# Patient Record
Sex: FEMALE | Race: ASIAN
[De-identification: names, ages, dates, MRNs, and addresses within clinical notes are randomized per-mention and may not be internally consistent; named-entity substitution may affect disease eponyms.]

---

## 2019-02-13 ENCOUNTER — HOSPITAL ENCOUNTER (INPATIENT)
Dept: HOSPITAL 1 - ED | Age: 79
LOS: 3 days | Discharge: HOME | DRG: 606 | End: 2019-02-16
Attending: FAMILY MEDICINE | Admitting: FAMILY MEDICINE
Payer: COMMERCIAL

## 2019-02-13 VITALS
BODY MASS INDEX: 32.36 KG/M2 | WEIGHT: 150 LBS | BODY MASS INDEX: 32.36 KG/M2 | WEIGHT: 150 LBS | HEIGHT: 57 IN | HEIGHT: 57 IN

## 2019-02-13 VITALS — DIASTOLIC BLOOD PRESSURE: 64 MMHG | SYSTOLIC BLOOD PRESSURE: 95 MMHG

## 2019-02-13 DIAGNOSIS — S80.821A: Primary | ICD-10-CM

## 2019-02-13 DIAGNOSIS — N17.0: ICD-10-CM

## 2019-02-13 DIAGNOSIS — E44.1: ICD-10-CM
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
## 2019-02-13 DIAGNOSIS — E02: ICD-10-CM
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
## 2019-02-13 DIAGNOSIS — Z91.81: ICD-10-CM

## 2019-02-13 DIAGNOSIS — I50.33: ICD-10-CM

## 2019-02-13 DIAGNOSIS — I48.2: ICD-10-CM

## 2019-02-13 DIAGNOSIS — D68.69: ICD-10-CM

## 2019-02-13 DIAGNOSIS — E11.65: ICD-10-CM

## 2019-02-13 DIAGNOSIS — I11.0: ICD-10-CM
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
## 2019-02-13 LAB
ALBUMIN SERPL-MCNC: 3.2 G/DL (ref 3.4–5)
ALP SERPL-CCNC: 143 U/L (ref 46–116)
ALT SERPL-CCNC: 12 U/L (ref 14–59)
AMYLASE SERPL-CCNC: 83 U/L (ref 25–115)
AST SERPL-CCNC: 15 U/L (ref 15–37)
BASOPHILS NFR BLD: 0.7 % (ref 0–2)
BILIRUB SERPL-MCNC: 1 MG/DL (ref 0.2–1)
BUN SERPL-MCNC: 35 MG/DL (ref 7–18)
CALCIUM SERPL-MCNC: 8.1 MG/DL (ref 8.5–10.1)
CHLORIDE SERPL-SCNC: 106 MMOL/L (ref 98–107)
CHOLEST SERPL-MCNC: 52 MG/DL (ref ?–200)
CHOLEST/HDLC SERPL: 1.3 MG/DL
CO2 SERPL-SCNC: 22.6 MMOL/L (ref 21–32)
CREAT SERPL-MCNC: 2.3 MG/DL (ref 0.6–1)
ERYTHROCYTE [DISTWIDTH] IN BLOOD BY AUTOMATED COUNT: 19.5 % (ref 11.5–14.5)
GFR SERPLBLD BASED ON 1.73 SQ M-ARVRAT: (no result) ML/MIN
GLUCOSE SERPL-MCNC: 119 MG/DL (ref 74–106)
HDLC SERPL-MCNC: 40 MG/DL (ref 40–60)
LIPASE SERPL-CCNC: 173 IU/L (ref 73–393)
MAGNESIUM SERPL-MCNC: 1.4 MG/DL (ref 1.8–2.4)
PHOSPHATE SERPL-MCNC: 4.4 MG/DL (ref 2.5–4.9)
PLATELET # BLD: 95 X10^3MCL (ref 130–400)
POTASSIUM SERPL-SCNC: 4.8 MMOL/L (ref 3.5–5.1)
PROT SERPL-MCNC: 6.6 G/DL (ref 6.4–8.2)
SODIUM SERPL-SCNC: 144 MMOL/L (ref 136–145)
T3 SERPL-MCNC: 0.87 NG/ML
T3RU NFR SERPL: 38 % UPTAKE (ref 30–39)
T4 FREE SERPL-MCNC: 1.58 NG/DL (ref 0.76–1.46)
T4 SERPL-MCNC: 10.1 UG/DL (ref 4.7–13.3)
T4/T3 UPTAKE INDEX SERPL: 3.8 UG/DL (ref 1.4–4.5)
TRIGL SERPL-MCNC: 62 MG/DL (ref ?–150)

## 2019-02-14 VITALS — DIASTOLIC BLOOD PRESSURE: 82 MMHG | SYSTOLIC BLOOD PRESSURE: 125 MMHG

## 2019-02-14 VITALS — SYSTOLIC BLOOD PRESSURE: 96 MMHG | DIASTOLIC BLOOD PRESSURE: 72 MMHG

## 2019-02-14 VITALS — SYSTOLIC BLOOD PRESSURE: 127 MMHG | DIASTOLIC BLOOD PRESSURE: 74 MMHG

## 2019-02-14 VITALS — DIASTOLIC BLOOD PRESSURE: 53 MMHG | SYSTOLIC BLOOD PRESSURE: 111 MMHG

## 2019-02-14 VITALS — DIASTOLIC BLOOD PRESSURE: 83 MMHG | SYSTOLIC BLOOD PRESSURE: 125 MMHG

## 2019-02-14 LAB
BASOPHILS NFR BLD: 0.4 % (ref 0–2)
BUN SERPL-MCNC: 35 MG/DL (ref 7–18)
CALCIUM SERPL-MCNC: 8 MG/DL (ref 8.5–10.1)
CHLORIDE SERPL-SCNC: 106 MMOL/L (ref 98–107)
CO2 SERPL-SCNC: 25 MMOL/L (ref 21–32)
CREAT SERPL-MCNC: 2.3 MG/DL (ref 0.6–1)
ERYTHROCYTE [DISTWIDTH] IN BLOOD BY AUTOMATED COUNT: 19.4 % (ref 11.5–14.5)
GFR SERPLBLD BASED ON 1.73 SQ M-ARVRAT: (no result) ML/MIN
GLUCOSE SERPL-MCNC: 145 MG/DL (ref 74–106)
MAGNESIUM SERPL-MCNC: 1.5 MG/DL (ref 1.8–2.4)
MICROSCOPIC UR-IMP: YES
PHOSPHATE SERPL-MCNC: 4.2 MG/DL (ref 2.5–4.9)
PLATELET # BLD: 94 X10^3MCL (ref 130–400)
POTASSIUM SERPL-SCNC: 4.6 MMOL/L (ref 3.5–5.1)
RBC # UR STRIP.AUTO: NEGATIVE /UL
SODIUM SERPL-SCNC: 142 MMOL/L (ref 136–145)
UA SPECIFIC GRAVITY: 1.02 (ref 1–1.03)

## 2019-02-14 PROCEDURE — 0H9KXZZ DRAINAGE OF RIGHT LOWER LEG SKIN, EXTERNAL APPROACH: ICD-10-PCS | Performed by: STUDENT IN AN ORGANIZED HEALTH CARE EDUCATION/TRAINING PROGRAM

## 2019-02-15 VITALS — DIASTOLIC BLOOD PRESSURE: 90 MMHG | SYSTOLIC BLOOD PRESSURE: 126 MMHG

## 2019-02-15 VITALS — DIASTOLIC BLOOD PRESSURE: 76 MMHG | SYSTOLIC BLOOD PRESSURE: 142 MMHG

## 2019-02-15 VITALS — DIASTOLIC BLOOD PRESSURE: 74 MMHG | SYSTOLIC BLOOD PRESSURE: 126 MMHG

## 2019-02-15 VITALS — SYSTOLIC BLOOD PRESSURE: 121 MMHG | DIASTOLIC BLOOD PRESSURE: 78 MMHG

## 2019-02-15 VITALS — SYSTOLIC BLOOD PRESSURE: 125 MMHG | DIASTOLIC BLOOD PRESSURE: 74 MMHG

## 2019-02-15 LAB
BASOPHILS NFR BLD: 0.4 % (ref 0–2)
BUN SERPL-MCNC: 34 MG/DL (ref 7–18)
CALCIUM SERPL-MCNC: 8.5 MG/DL (ref 8.5–10.1)
CHLORIDE SERPL-SCNC: 106 MMOL/L (ref 98–107)
CO2 SERPL-SCNC: 26.9 MMOL/L (ref 21–32)
CREAT SERPL-MCNC: 1.8 MG/DL (ref 0.6–1)
ERYTHROCYTE [DISTWIDTH] IN BLOOD BY AUTOMATED COUNT: 19 % (ref 11.5–14.5)
GFR SERPLBLD BASED ON 1.73 SQ M-ARVRAT: (no result) ML/MIN
GLUCOSE SERPL-MCNC: 108 MG/DL (ref 74–106)
MAGNESIUM SERPL-MCNC: 1.6 MG/DL (ref 1.8–2.4)
PLATELET # BLD: 94 X10^3MCL (ref 130–400)
POTASSIUM SERPL-SCNC: 4.1 MMOL/L (ref 3.5–5.1)
SODIUM SERPL-SCNC: 143 MMOL/L (ref 136–145)

## 2019-02-16 VITALS — DIASTOLIC BLOOD PRESSURE: 63 MMHG | SYSTOLIC BLOOD PRESSURE: 95 MMHG

## 2019-02-16 VITALS — SYSTOLIC BLOOD PRESSURE: 150 MMHG | DIASTOLIC BLOOD PRESSURE: 94 MMHG

## 2019-02-16 VITALS — SYSTOLIC BLOOD PRESSURE: 132 MMHG | DIASTOLIC BLOOD PRESSURE: 78 MMHG

## 2019-02-16 VITALS — SYSTOLIC BLOOD PRESSURE: 95 MMHG | DIASTOLIC BLOOD PRESSURE: 63 MMHG

## 2019-02-16 LAB
BASOPHILS NFR BLD: 0.4 % (ref 0–2)
BUN SERPL-MCNC: 28 MG/DL (ref 7–18)
CALCIUM SERPL-MCNC: 9 MG/DL (ref 8.5–10.1)
CHLORIDE SERPL-SCNC: 107 MMOL/L (ref 98–107)
CO2 SERPL-SCNC: 29.8 MMOL/L (ref 21–32)
CREAT SERPL-MCNC: 1.3 MG/DL (ref 0.6–1)
ERYTHROCYTE [DISTWIDTH] IN BLOOD BY AUTOMATED COUNT: 18.8 % (ref 11.5–14.5)
GFR SERPLBLD BASED ON 1.73 SQ M-ARVRAT: (no result) ML/MIN
GLUCOSE SERPL-MCNC: 109 MG/DL (ref 74–106)
PLATELET # BLD: 107 X10^3MCL (ref 130–400)
POTASSIUM SERPL-SCNC: 3.4 MMOL/L (ref 3.5–5.1)
SODIUM SERPL-SCNC: 147 MMOL/L (ref 136–145)

## 2019-08-12 ENCOUNTER — HOSPITAL ENCOUNTER (INPATIENT)
Dept: HOSPITAL 1 - ED | Age: 79
LOS: 16 days | Discharge: NURSING, REHAB OR LONG-TERM CARE | DRG: 870 | End: 2019-08-28
Attending: HOSPITALIST | Admitting: HOSPITALIST
Payer: COMMERCIAL

## 2019-08-12 VITALS — DIASTOLIC BLOOD PRESSURE: 62 MMHG | SYSTOLIC BLOOD PRESSURE: 90 MMHG

## 2019-08-12 VITALS
HEIGHT: 62 IN | HEIGHT: 62 IN | WEIGHT: 163.14 LBS | WEIGHT: 163.14 LBS | BODY MASS INDEX: 30.02 KG/M2 | BODY MASS INDEX: 30.02 KG/M2

## 2019-08-12 VITALS — SYSTOLIC BLOOD PRESSURE: 93 MMHG | DIASTOLIC BLOOD PRESSURE: 54 MMHG

## 2019-08-12 VITALS — DIASTOLIC BLOOD PRESSURE: 63 MMHG | SYSTOLIC BLOOD PRESSURE: 92 MMHG

## 2019-08-12 VITALS — DIASTOLIC BLOOD PRESSURE: 66 MMHG | SYSTOLIC BLOOD PRESSURE: 101 MMHG

## 2019-08-12 VITALS — DIASTOLIC BLOOD PRESSURE: 56 MMHG | SYSTOLIC BLOOD PRESSURE: 94 MMHG

## 2019-08-12 VITALS — DIASTOLIC BLOOD PRESSURE: 59 MMHG | SYSTOLIC BLOOD PRESSURE: 92 MMHG

## 2019-08-12 VITALS — DIASTOLIC BLOOD PRESSURE: 63 MMHG | SYSTOLIC BLOOD PRESSURE: 90 MMHG

## 2019-08-12 VITALS — DIASTOLIC BLOOD PRESSURE: 75 MMHG | SYSTOLIC BLOOD PRESSURE: 110 MMHG

## 2019-08-12 VITALS — SYSTOLIC BLOOD PRESSURE: 89 MMHG | DIASTOLIC BLOOD PRESSURE: 71 MMHG

## 2019-08-12 VITALS — DIASTOLIC BLOOD PRESSURE: 75 MMHG | SYSTOLIC BLOOD PRESSURE: 90 MMHG

## 2019-08-12 VITALS — SYSTOLIC BLOOD PRESSURE: 109 MMHG | DIASTOLIC BLOOD PRESSURE: 77 MMHG

## 2019-08-12 VITALS — SYSTOLIC BLOOD PRESSURE: 110 MMHG | DIASTOLIC BLOOD PRESSURE: 68 MMHG

## 2019-08-12 VITALS — SYSTOLIC BLOOD PRESSURE: 68 MMHG | DIASTOLIC BLOOD PRESSURE: 44 MMHG

## 2019-08-12 VITALS — SYSTOLIC BLOOD PRESSURE: 96 MMHG | DIASTOLIC BLOOD PRESSURE: 59 MMHG

## 2019-08-12 VITALS — SYSTOLIC BLOOD PRESSURE: 89 MMHG | DIASTOLIC BLOOD PRESSURE: 55 MMHG

## 2019-08-12 DIAGNOSIS — I27.20: ICD-10-CM

## 2019-08-12 DIAGNOSIS — Z79.84: ICD-10-CM

## 2019-08-12 DIAGNOSIS — G93.41: ICD-10-CM

## 2019-08-12 DIAGNOSIS — J90: ICD-10-CM

## 2019-08-12 DIAGNOSIS — I48.2: ICD-10-CM

## 2019-08-12 DIAGNOSIS — R65.20: ICD-10-CM

## 2019-08-12 DIAGNOSIS — J96.01: ICD-10-CM

## 2019-08-12 DIAGNOSIS — E83.39: ICD-10-CM

## 2019-08-12 DIAGNOSIS — A41.9: Primary | ICD-10-CM

## 2019-08-12 DIAGNOSIS — N18.9: ICD-10-CM

## 2019-08-12 DIAGNOSIS — E87.5: ICD-10-CM

## 2019-08-12 DIAGNOSIS — Z93.1: ICD-10-CM

## 2019-08-12 DIAGNOSIS — E43: ICD-10-CM

## 2019-08-12 DIAGNOSIS — E87.0: ICD-10-CM

## 2019-08-12 DIAGNOSIS — M81.0: ICD-10-CM

## 2019-08-12 DIAGNOSIS — N17.0: ICD-10-CM

## 2019-08-12 DIAGNOSIS — J69.0: ICD-10-CM

## 2019-08-12 DIAGNOSIS — I50.32: ICD-10-CM

## 2019-08-12 DIAGNOSIS — L89.152: ICD-10-CM

## 2019-08-12 DIAGNOSIS — E11.9: ICD-10-CM

## 2019-08-12 DIAGNOSIS — N39.0: ICD-10-CM

## 2019-08-12 LAB
ALBUMIN SERPL-MCNC: 1.7 G/DL (ref 3.4–5)
ALBUMIN SERPL-MCNC: 1.9 G/DL (ref 3.4–5)
ALP SERPL-CCNC: 113 U/L (ref 46–116)
ALP SERPL-CCNC: 118 U/L (ref 46–116)
ALT SERPL-CCNC: 12 U/L (ref 14–59)
ALT SERPL-CCNC: 15 U/L (ref 14–59)
AMYLASE SERPL-CCNC: 42 U/L (ref 25–115)
AST SERPL-CCNC: 13 U/L (ref 15–37)
AST SERPL-CCNC: 19 U/L (ref 15–37)
BASOPHILS NFR BLD: 0.1 % (ref 0–2)
BASOPHILS NFR BLD: 0.3 % (ref 0–2)
BILIRUB SERPL-MCNC: 0.5 MG/DL (ref 0.2–1)
BILIRUB SERPL-MCNC: 0.6 MG/DL (ref 0.2–1)
BUN SERPL-MCNC: 41 MG/DL (ref 7–18)
BUN SERPL-MCNC: 46 MG/DL (ref 7–18)
BUN SERPL-MCNC: 48 MG/DL (ref 7–18)
CALCIUM SERPL-MCNC: 7.5 MG/DL (ref 8.5–10.1)
CALCIUM SERPL-MCNC: 8.1 MG/DL (ref 8.5–10.1)
CALCIUM SERPL-MCNC: 8.3 MG/DL (ref 8.5–10.1)
CHLORIDE SERPL-SCNC: 103 MMOL/L (ref 98–107)
CHLORIDE SERPL-SCNC: 105 MMOL/L (ref 98–107)
CHLORIDE SERPL-SCNC: 105 MMOL/L (ref 98–107)
CHOLEST SERPL-MCNC: 97 MG/DL (ref ?–200)
CHOLEST/HDLC SERPL: 3.6 MG/DL
CO2 SERPL-SCNC: 23.7 MMOL/L (ref 21–32)
CO2 SERPL-SCNC: 29.2 MMOL/L (ref 21–32)
CO2 SERPL-SCNC: 29.3 MMOL/L (ref 21–32)
CREAT SERPL-MCNC: 1.1 MG/DL (ref 0.6–1)
CREAT SERPL-MCNC: 1.2 MG/DL (ref 0.6–1)
CREAT SERPL-MCNC: 1.4 MG/DL (ref 0.6–1)
ERYTHROCYTE [DISTWIDTH] IN BLOOD BY AUTOMATED COUNT: 18 % (ref 11.5–14.5)
ERYTHROCYTE [DISTWIDTH] IN BLOOD BY AUTOMATED COUNT: 18 % (ref 11.5–14.5)
GFR SERPLBLD BASED ON 1.73 SQ M-ARVRAT: (no result) ML/MIN
GLUCOSE SERPL-MCNC: 147 MG/DL (ref 74–106)
GLUCOSE SERPL-MCNC: 160 MG/DL (ref 74–106)
GLUCOSE SERPL-MCNC: 92 MG/DL (ref 74–106)
HDLC SERPL-MCNC: 27 MG/DL (ref 40–60)
IRON SERPL-MCNC: 47 UG/DL (ref 50–170)
LIPASE SERPL-CCNC: 80 IU/L (ref 73–393)
MAGNESIUM SERPL-MCNC: 2.3 MG/DL (ref 1.8–2.4)
MICROSCOPIC UR-IMP: YES
PHOSPHATE SERPL-MCNC: 6.8 MG/DL (ref 2.5–4.9)
PLATELET # BLD: 150 X10^3MCL (ref 130–400)
PLATELET # BLD: 177 X10^3MCL (ref 130–400)
POTASSIUM SERPL-SCNC: 4.6 MMOL/L (ref 3.5–5.1)
POTASSIUM SERPL-SCNC: 6.4 MMOL/L (ref 3.5–5.1)
POTASSIUM SERPL-SCNC: 6.8 MMOL/L (ref 3.5–5.1)
PROT SERPL-MCNC: 4.9 G/DL (ref 6.4–8.2)
PROT SERPL-MCNC: 5.3 G/DL (ref 6.4–8.2)
RBC # BLD AUTO: 2.84 M/MM3 (ref 4.1–5.1)
RBC # UR STRIP.AUTO: (no result) /UL
SODIUM SERPL-SCNC: 137 MMOL/L (ref 136–145)
SODIUM SERPL-SCNC: 138 MMOL/L (ref 136–145)
SODIUM SERPL-SCNC: 141 MMOL/L (ref 136–145)
T3 SERPL-MCNC: 0.32 NG/ML
T3RU NFR SERPL: 42 % UPTAKE (ref 30–39)
T4 FREE SERPL-MCNC: 0.99 NG/DL (ref 0.76–1.46)
T4 SERPL-MCNC: 3.6 UG/DL (ref 4.7–13.3)
T4/T3 UPTAKE INDEX SERPL: 1.5 UG/DL (ref 1.4–4.5)
TIBC SERPL-MCNC: 174 UG/DL (ref 250–450)
TRIGL SERPL-MCNC: 119 MG/DL (ref ?–150)
UA SPECIFIC GRAVITY: >=1.03 (ref 1–1.03)

## 2019-08-12 PROCEDURE — G0378 HOSPITAL OBSERVATION PER HR: HCPCS

## 2019-08-12 PROCEDURE — 0BH17EZ INSERTION OF ENDOTRACHEAL AIRWAY INTO TRACHEA, VIA NATURAL OR ARTIFICIAL OPENING: ICD-10-PCS | Performed by: EMERGENCY MEDICINE

## 2019-08-12 PROCEDURE — A4628 OROPHARYNGEAL SUCTION CATH: HCPCS

## 2019-08-12 PROCEDURE — 05HM33Z INSERTION OF INFUSION DEVICE INTO RIGHT INTERNAL JUGULAR VEIN, PERCUTANEOUS APPROACH: ICD-10-PCS | Performed by: EMERGENCY MEDICINE

## 2019-08-12 PROCEDURE — 5A1955Z RESPIRATORY VENTILATION, GREATER THAN 96 CONSECUTIVE HOURS: ICD-10-PCS | Performed by: INTERNAL MEDICINE

## 2019-08-12 PROCEDURE — C1729 CATH, DRAINAGE: HCPCS

## 2019-08-12 NOTE — NUR
ADJUSTED FENTANYL TO 2.7MCG/KG/H PER DR. COLLINS WANTS 200MCG PER HOUR. PT WAS
OPENING EYES AND TRYING TO GRAB TUBES. VSS. NO DISTRESS NOED. PT EYES CLOSED
AND WITH VISUAL CHEST RISE AND FALL.

## 2019-08-12 NOTE — NUR
PT BIBA PER PARAMEDIC PT WAS LAST SEEN IN NORMAL LOC AT 0400. PER PARAMEDIC
NURSE STS THAT PT WOULD COMMUNICATE BY NODDING. PT BECAME MORE UNRESPONSIVE. PT
WAS BAGGED IN THE FEILD AND UPON ARRIVAL. DR. ZUNIGA AT BEDSIDE FOR INTUBATION
AND CENTRAL LINE PLACEMENT. PT ARRIVED W/ URINARY CATH, PER PARAMEDIC PT WAS
ADMITTED TO FACILITY FOR UTI. DR. ZUNIGA, RESPIRATORY, EMT, NURSE CARROLL BECKMAN
AND JIM AT BEDSIDE.

## 2019-08-12 NOTE — NUR
URINE OUTPUT @700 CLEAR AND YELLOW. FAMILY AT BEDSIDE. VSS. RESP E/U. PT
TOLERATING WELL. DR. العلي AT BEDSIDE WITH FAMILY.

## 2019-08-12 NOTE — NUR
RECIEVED REPORT FROM ABRAN CEBALLOS. POC DISCUSSED. NURSING UPDATES. RESUMED CARE OF
PT. SEE SHIFT ASSESSMENT FOR ASSESSMENT.

## 2019-08-12 NOTE — NUR
PT ARRIVED FROM ED ON GURNEY ACCOMPANIED BY RNS, RTS, EMTS. PT IS INTUBATED
AND SEDATED ON FENTANYL @ 2.5 MCG/KG/HR TO RSS = 5. SLUGGISH RESPONSE TO
PAINFUL STIMULI. DOES NOT FOLLOW COMMANDS. PUPILS SLUGGISH BILATERALLY. SIZE
7.5 ETT INTACT AND SECURED, 20 @ LL. OGT INTACT AND SECURED, CLAMPED. R IJ CVC
PATENT/CDI. A FIB. S1 S2. ABD IS SOFT AND FLAT. G TUBE INTACT AND CDI. R HAND,
L HAND IVS PATENT/CDI. RLE IO INTACT. F/C TO GRAVITY, URINE IS YELLOW, FAIR
OUTPUT. GENERALIZED WEAKNESS. ON SOFT B/L WRIST RESTRAINTS. NAD. HOB ELEVATED,
BED LOW, SIDE RAILS UP X2, CALL LIGHT IN REACH.

## 2019-08-12 NOTE — NUR
PT BECOMING HYPOTENSIVE WITH LOWEST MAP 47 IN PAST [address] SUSANNE MCLEAN,
ORDERS RECIEVED TO BOLUS 500 ML NS AND WILL START PRESSOR TX.

## 2019-08-12 NOTE — NUR
PT MEDICATED PER EMAR WITH VELTASSA VIA GTUBE, +BOWEL SOUNDS UPON
AUSCULTATION, FOLLOWED MED ADMIN WITH 20CC OF STERILE WATER, PT TOLERATED WELL.

## 2019-08-12 NOTE — NUR
PRESSURE ULCER NOTED TO SACRUM. PHOTOGRAPHIC DOCUMENTATION TAKEN.
1.5CM X 1CM ULCER W/ PINK WOUND BED. NO D/C NOTED. OPTIFOAM APPLIED. FLOATED.

## 2019-08-12 NOTE — NUR
PT TAKEN TO CT.
REPORT RECEIVED FROM LAURIE OSULLIVAN TO ASSUME CARE OF PT.
OGNEEDS TO BE PULLED BACK AND XRAY NEED TO BE TAKEN. PER DR. العلي
DO NOT USE OG NEEDS XRAY FOR PLACEMENT.

## 2019-08-12 NOTE — NUR
FENTANYL INCREASED 1.3MCG/KG/HR. PT WAS OPENING EYES AND AGGITATEDTRYING TO
GRAB TUBE. DR. العلي MADE AWARE.

## 2019-08-12 NOTE — NUR
REPORT GIVEN TO TUCKER OSULLIVAN, TO ASSUME CARE OF PT. INFORMED TO NOT USE OG BECAUSE
OF WROGE PLACEMENT. RN INFOMRED THAT PT STILL HAS I&O TO RIGHT TIB. VSS UPON
TRANSPORTATION.

## 2019-08-12 NOTE — NUR
LEVOPHED INITIATED AT THIS TIME D/T MAP PERSITENTLY UNDER 60 FOR PAST 30 MINS.
LEVOPHED INITIATED @ 2MCG/MIN THROUGH R IJ CVC.

## 2019-08-13 VITALS — DIASTOLIC BLOOD PRESSURE: 54 MMHG | SYSTOLIC BLOOD PRESSURE: 91 MMHG

## 2019-08-13 VITALS — SYSTOLIC BLOOD PRESSURE: 94 MMHG | DIASTOLIC BLOOD PRESSURE: 62 MMHG

## 2019-08-13 VITALS — DIASTOLIC BLOOD PRESSURE: 71 MMHG | SYSTOLIC BLOOD PRESSURE: 96 MMHG

## 2019-08-13 VITALS — DIASTOLIC BLOOD PRESSURE: 62 MMHG | SYSTOLIC BLOOD PRESSURE: 113 MMHG

## 2019-08-13 VITALS — SYSTOLIC BLOOD PRESSURE: 95 MMHG | DIASTOLIC BLOOD PRESSURE: 60 MMHG

## 2019-08-13 VITALS — SYSTOLIC BLOOD PRESSURE: 100 MMHG | DIASTOLIC BLOOD PRESSURE: 66 MMHG

## 2019-08-13 VITALS — SYSTOLIC BLOOD PRESSURE: 81 MMHG | DIASTOLIC BLOOD PRESSURE: 49 MMHG

## 2019-08-13 VITALS — DIASTOLIC BLOOD PRESSURE: 66 MMHG | SYSTOLIC BLOOD PRESSURE: 87 MMHG

## 2019-08-13 VITALS — SYSTOLIC BLOOD PRESSURE: 111 MMHG | DIASTOLIC BLOOD PRESSURE: 77 MMHG

## 2019-08-13 VITALS — SYSTOLIC BLOOD PRESSURE: 115 MMHG | DIASTOLIC BLOOD PRESSURE: 85 MMHG

## 2019-08-13 VITALS — DIASTOLIC BLOOD PRESSURE: 69 MMHG | SYSTOLIC BLOOD PRESSURE: 98 MMHG

## 2019-08-13 VITALS — SYSTOLIC BLOOD PRESSURE: 97 MMHG | DIASTOLIC BLOOD PRESSURE: 64 MMHG

## 2019-08-13 VITALS — SYSTOLIC BLOOD PRESSURE: 117 MMHG | DIASTOLIC BLOOD PRESSURE: 58 MMHG

## 2019-08-13 VITALS — DIASTOLIC BLOOD PRESSURE: 68 MMHG | SYSTOLIC BLOOD PRESSURE: 108 MMHG

## 2019-08-13 VITALS — DIASTOLIC BLOOD PRESSURE: 74 MMHG | SYSTOLIC BLOOD PRESSURE: 113 MMHG

## 2019-08-13 LAB
ALBUMIN SERPL-MCNC: 2 G/DL (ref 3.4–5)
BASOPHILS NFR BLD: 0 % (ref 0–2)
BUN SERPL-MCNC: 36 MG/DL (ref 7–18)
CALCIUM SERPL-MCNC: 8.4 MG/DL (ref 8.5–10.1)
CHLORIDE SERPL-SCNC: 105 MMOL/L (ref 98–107)
CO2 SERPL-SCNC: 27.7 MMOL/L (ref 21–32)
CREAT SERPL-MCNC: 1 MG/DL (ref 0.6–1)
ERYTHROCYTE [DISTWIDTH] IN BLOOD BY AUTOMATED COUNT: 17.6 % (ref 11.5–14.5)
GFR SERPLBLD BASED ON 1.73 SQ M-ARVRAT: (no result) ML/MIN
GLUCOSE SERPL-MCNC: 120 MG/DL (ref 74–106)
MAGNESIUM SERPL-MCNC: 2.1 MG/DL (ref 1.8–2.4)
PHOSPHATE SERPL-MCNC: 4 MG/DL (ref 2.5–4.9)
PLATELET # BLD: 163 X10^3MCL (ref 130–400)
POTASSIUM SERPL-SCNC: 4.2 MMOL/L (ref 3.5–5.1)
SODIUM SERPL-SCNC: 142 MMOL/L (ref 136–145)

## 2019-08-13 NOTE — NUR
PATIENT REMAINS INTUBATED WITH NO SEDATION AT THIS TIME. PATIENT OPENS HER
EYES SPONTANEOUSLY AND FOLLOWS SOME SIMPLE COMMANDS. ETT 7.5 SECURED TO FACE
AND AT 20CM AT LIPLINE. ETT TO VENT VIA VCV/AC MODE: FIO2 30%, RATE 14, 
AND PEEP 5. OGT IN PLACE AND SECURED TO ETT. OGT PLACEMENT VERIFIED WITH SOME
AIR BOLUS. OGT CLAMPED. CVC TO RIJ WITH DRESSING IN PLACE. OTHER IV SITES TO
LEFT HAND AND RIGHT HAND. PEG TUBE IN PLACE AND CONNECTED TO TUBE FEEDING WITH
VITAL AF AT 10ML/HR. NO RESIDUAL AT THIS TIME. TF INCREASED TO 20ML/HR. AND
FREE WATER FLUSH AT 50ML/Q4HR. DIAZ CATH TO GRAVITY DRAINING YELLOW URINE.
CALL LIGHT WITHIN REACH. SIDE RAILS UP X3. BED IS AT LOWEST POSITION. ALARM IS
ON. HEAD OF BED ELEVATED. BLE ELEVATED ON PILLOWS.

## 2019-08-13 NOTE — NUR
PT W/ AGITATION. TITRATED VERSED FROM OFF TO 1MG PT TOLERATED WELL. NO S/S OF
DISTRESS. LINENS AND GOWN CHANGED FOR PT BM BROWN UNFORMED LIQUID. PT
TOLERATED WELL. WILL CONT TO MONITOR.

## 2019-08-13 NOTE — NUR
PATIENT HAD BM WITH MODERATE AMOUNT OF YELLOW LIQUID STOOL. STOOL SAMPLE WAS
COLLECTED AND SENT TO THE LAB FOR OB.

## 2019-08-13 NOTE — NUR
NURSING UPDATES PER MARGARITA DAUGHTER OF PT. PHONE PROTOCOL FOLLOWED. MARGARITA
STATED SHE WOULD BE BY LATER. WILL ENDORSE.

## 2019-08-13 NOTE — NUR
RECEIVED REPORT FROM ABRAN FALL. PT IS ALERT AND ABLE TO FOLLOW SIMPLE COMMANDS.
PT IS INTUBATED ON NO SEDATION. 7.5 ETT AT 20 CM LL. LUNG SOUNDS COURSE
CRACKLES TO BILATERAL UPPER LOBES, DIMINISHED TO BILATERAL LOWER LOBES. S1 S2
HEART SOUNDS AUSCULTATED IN A FIB. PERIPHERAL IV TO RIJ AND LEFT HAND
PATENT AND DRESSING CDI. PT HAS MODERATE PULSES TO BUE/BLE. SKIN IS WARM
AND CONSISTENT WITH ETHNICITY. PT HAS SCATTERED ECCHYMOSIS TO BUE/BLE. AND
WOUND TO SACRUM WITH OPTIFOAM IN PLACE. ABD  IS SOFT AND FLAT WITH ACTIVE
BOWEL SOUNDS X4Q. PEG TUBE TO UPPER QUADRANTS. VITAL AF INFUSING AT 35 ML/HR.
DIAZ DRAINING VIA GRAVITY. URINE IS YELLOW WITH FAIR OUTPUT. ALL QUESTIONS
AND CONCERNS ANSWERED.

## 2019-08-13 NOTE — NUR
RT JORDY IS AT BEDSIDE AND SWITCHES THE VENT SETTING TO VCV/AC MODE: FIO2 30%,
RATE 14, , PEEP 5.
RESIDUAL 0 AT THIS TIME. TUBE FEEDING RATE INCREASED FROM 20ML/HR TO 30ML/HR
AS ORDERED.

## 2019-08-13 NOTE — NUR
RESIDUAL CHECKED 0. TUBE FEEDING RATE INCREASED FROM 30ML/HR TO 35ML/HR TO
REACH THE GOAL AS ORDERED.

## 2019-08-13 NOTE — NUR
IO AT RIGHT LEG REMOVED WITH THE TIP INTACT. NO BLEEDING NOTED AT THIS TIME.
PRESSURE DRESSING APPLIED.
OGT REMOVED WITH ASSISTANCE OF RT SPENCE. THE OGT TIP INTACT.

## 2019-08-13 NOTE — NUR
WOUND CARE EVALUATION NOTE:
REASON FOR EVALUATION: LOW NEVILLE SCALE AND SACRALCOCCYX WOUND
SKIN ASSESSMENT DONE WITH THIS 80 Y/O FEMALE PT ADMITTED FROM [facility] TO [facility]
WITH INITIAL DX AMS.  PAST MEDICAL HX INCLUDES HTN, DM, H/O GI BLEED,
A-FIB AND PT. ADMITTED WITH PRESSURE ULCER STAGE 2 TO SACRALCOYX. PT IS
AWAKE AND INTUBATED. SKIN IS WARM AND MOIST, BUE MULTIPLE ECCHYMOSIS FROM
BLOOD DRAW. BLE WITH DRY FLAKY SKIN, NO HAIR GROWTH, NO EDEMA.  DORSAL PEDAL
PULSES PRESENT AND NORMAL. PLAN OF CARE DISCUSSE WITH PRIMARY RN AND PT. PT.
BLINK HER EYES.
 
INTEGUMENTARY:
-MOIST ASSOCIATED DERMATITIS TO BUTTOCKS, PERINEUM, GROINS AREA, SKIN REDNESS
INTACT.
-SACRALCOCCYX PRESSURE ULCER STAGE 2, WITH 1.5X1X0.1 CM WOUND BED  %
GRANULATING AND CINDY-WOUND  SKIN INTACT, OPTIFOAM IN PLACE
-BLE DRYNESS WITH SKIN INTACT
-BILATERAL HEELS BLANCHABLE REDNESS, SKIN INTACT
 
RECOMMENDATIONS:
-CLEANSE WITH SOAP AND WATER, PAT DRY, APPLY HYDRAGUARD TO BUTTOCKS, PERINEUM,
GROINS AREAS BID AND OPEN TO AIR
-CLEANSE SACRALCOCCYX PRESSURE INJURY WITH NS, PAT DRY, APPLY Z GUARD AND
COVER WITH OPTIFOAM QD AND PRN IF SOILING.
-OFFLOAD BILATERAL HEELS BY PLACING PILLOWS UNDER CALVES UNLESS OTHERWISE
CONTRAINDICATED
-PRESSURE REDISTRIBUTION SURFACE THERAPY
-TURN AND REPOSITION Q2H, OFFLOAD RIGHT, LEFT EARS AND SACRALCOCCYX
-KEEP SKIN DRY AND CLEAN AT ALL TIMES
-CONTINUE TO FOLLOW RD RECOMMENDATIONS
 
PLEASE CONTACT WOUND CARE NURSE FOR ANY QUESTION AND CHANGE OF WOUND
CONDITION.

## 2019-08-14 VITALS — SYSTOLIC BLOOD PRESSURE: 111 MMHG | DIASTOLIC BLOOD PRESSURE: 69 MMHG

## 2019-08-14 VITALS — DIASTOLIC BLOOD PRESSURE: 60 MMHG | SYSTOLIC BLOOD PRESSURE: 111 MMHG

## 2019-08-14 VITALS — SYSTOLIC BLOOD PRESSURE: 108 MMHG | DIASTOLIC BLOOD PRESSURE: 75 MMHG

## 2019-08-14 VITALS — DIASTOLIC BLOOD PRESSURE: 71 MMHG | SYSTOLIC BLOOD PRESSURE: 136 MMHG

## 2019-08-14 VITALS — DIASTOLIC BLOOD PRESSURE: 78 MMHG | SYSTOLIC BLOOD PRESSURE: 127 MMHG

## 2019-08-14 VITALS — DIASTOLIC BLOOD PRESSURE: 64 MMHG | SYSTOLIC BLOOD PRESSURE: 126 MMHG

## 2019-08-14 VITALS — DIASTOLIC BLOOD PRESSURE: 66 MMHG | SYSTOLIC BLOOD PRESSURE: 129 MMHG

## 2019-08-14 VITALS — SYSTOLIC BLOOD PRESSURE: 122 MMHG | DIASTOLIC BLOOD PRESSURE: 71 MMHG

## 2019-08-14 VITALS — DIASTOLIC BLOOD PRESSURE: 87 MMHG | SYSTOLIC BLOOD PRESSURE: 112 MMHG

## 2019-08-14 VITALS — SYSTOLIC BLOOD PRESSURE: 129 MMHG | DIASTOLIC BLOOD PRESSURE: 66 MMHG

## 2019-08-14 VITALS — DIASTOLIC BLOOD PRESSURE: 76 MMHG | SYSTOLIC BLOOD PRESSURE: 130 MMHG

## 2019-08-14 VITALS — DIASTOLIC BLOOD PRESSURE: 54 MMHG | SYSTOLIC BLOOD PRESSURE: 111 MMHG

## 2019-08-14 VITALS — DIASTOLIC BLOOD PRESSURE: 75 MMHG | SYSTOLIC BLOOD PRESSURE: 108 MMHG

## 2019-08-14 VITALS — DIASTOLIC BLOOD PRESSURE: 47 MMHG | SYSTOLIC BLOOD PRESSURE: 106 MMHG

## 2019-08-14 VITALS — SYSTOLIC BLOOD PRESSURE: 145 MMHG | DIASTOLIC BLOOD PRESSURE: 84 MMHG

## 2019-08-14 LAB
ALBUMIN SERPL-MCNC: 1.8 G/DL (ref 3.4–5)
BASOPHILS NFR BLD: 0 % (ref 0–2)
BUN SERPL-MCNC: 28 MG/DL (ref 7–18)
CALCIUM SERPL-MCNC: 7.9 MG/DL (ref 8.5–10.1)
CHLORIDE SERPL-SCNC: 110 MMOL/L (ref 98–107)
CO2 SERPL-SCNC: 27.5 MMOL/L (ref 21–32)
CREAT SERPL-MCNC: 0.8 MG/DL (ref 0.6–1)
ERYTHROCYTE [DISTWIDTH] IN BLOOD BY AUTOMATED COUNT: 18.4 % (ref 11.5–14.5)
GFR SERPLBLD BASED ON 1.73 SQ M-ARVRAT: (no result) ML/MIN
GLUCOSE SERPL-MCNC: 175 MG/DL (ref 74–106)
MAGNESIUM SERPL-MCNC: 2 MG/DL (ref 1.8–2.4)
PHOSPHATE SERPL-MCNC: 2.7 MG/DL (ref 2.5–4.9)
PLATELET # BLD: 152 X10^3MCL (ref 130–400)
POTASSIUM SERPL-SCNC: 3.4 MMOL/L (ref 3.5–5.1)
SODIUM SERPL-SCNC: 143 MMOL/L (ref 136–145)

## 2019-08-14 NOTE — NUR
PT STILL ON CPAP. RESPIRATORY RATE 33, SPO2 94%. MAXX RT CALLED WITH UPDATE,
WILL CONTINUE TO MONITOR.

## 2019-08-14 NOTE — NUR
PT PROVIDED WITH FULL BED BATH, DIAZ CARE, AND CVC CARE. ALL SUCTION
EQUIPMENT CHANGED FOR NEW ONES. PT TOLERATED WELL.

## 2019-08-14 NOTE — NUR
DR GUERIN AT BEDSIDE TO ASSESS PT. MADE AWARE PT HAS INCREASED BUE PITTING
EDEMA. RECOMMMENDED LASIX PER DR NUNEZ TO START AS NEEDED. AWAITING FOR
FURTHER ORDERS.

## 2019-08-14 NOTE — NUR
DR. PETIT AT BEDSIDE TO SEE AND ASSESS PT, PLAN TO ATTEMPT WEANING AGAIN
LATER. WILL CONT TO MONITOR.

## 2019-08-14 NOTE — NUR
REC'D REPORT FROM MADDIE OSULLIVAN TO ASSUME CARE. PT INTUBATED WITH NO SEDATION WITH
RSS 2. ABLE TO FOLLOW COMMANDS. PERRLA NOTED. 7.5 ETT SECURED @ 20 CM LL.
TRACHEA MIDLINE. NO JVD NOTED. RIJ CVC INTACT, PORTS PATENT, DSG CDI. ETT TO
VENT: AC MODE RATE 14, , PEEP 5, FIO2 30%.  CHEST RISE EQUAL AND
SYMMETRICAL. LUNG SOUNDS COARSE CRACKLES BUL, DIM BASES. CARDIAC MONITOR IN
PLACE SHOWING NSR. /75 MAP 86, HR 90. CHEST WALL STABLE. DENIES ANY CP,
SYNCOPE, OR DIZZINESS. PULSES PALPABLE X4. CAP REFILL < 3 SECS. BUE/BLE 1+
PITTING EDEMA.  PT ON ELIQUIS. IVF NS @ 10ML/HR. VITAL AF INFUSING VIA PEG
TUBE @ 35ML/HR FWF 100ML Q2H. GRV=5ML REPLACED. PT TOLERATING WELL. ABD
ROUND, SOFT, NONTENDER TO TOUCH. BOWEL SOUNDS ACTIVE. F/C INTACT AND DRAINING
VIA GRAVITY YELLOW URINE. NO VAGINAL BLEEDING. NO LABIAL EDEMA NOTED. ULCER TO
SACRUM WITH OPTIFOAM IN PLACE. ECCHYMOSIS TO BUE. DARK SKIN DISCOLORATION TO
BLE. PT BEDBOUND. TURNED AND REPOSITIONED Q2H FOR PRESSURE RELIEF. BUE SOFT
WRIST RESTRAINTS IN PLACE FOR PT SAFETY. CALM AND COOPERATIIVE. POSITIVE
FAMILY SUPPORT AT BEDSIDE. ALL NEEDS MET AT THIS TIME. WILL CONTINUE TO
MONITOR.

## 2019-08-14 NOTE — NUR
PROVIDED PT WITH NORCO VIA PEG TUBE. PT APPEARED TO BE EXPERIENCING
DISCOMFORT. PT NODDED YES WHEN ASKED IF SHE WOULD LIKE PAIN MEDICATION.
WEENING MEASURES WILL BE STARTED THIS MORNING, THEREFORE SEDATION COULD NOT
BE TURNED BACK ON. WILL CONTINUE TO MONITOR.

## 2019-08-14 NOTE — NUR
DR. DOS SANTOS, RESIDENTS, CHARGE RN AND PRIMARY RN AT BEDSIDE FOR MORNING
ROUNDS. PLAN OF CARE DISCUSED. WILL CONT TO MONITOR.

## 2019-08-15 VITALS — SYSTOLIC BLOOD PRESSURE: 93 MMHG | DIASTOLIC BLOOD PRESSURE: 51 MMHG

## 2019-08-15 VITALS — SYSTOLIC BLOOD PRESSURE: 99 MMHG | DIASTOLIC BLOOD PRESSURE: 52 MMHG

## 2019-08-15 VITALS — SYSTOLIC BLOOD PRESSURE: 106 MMHG | DIASTOLIC BLOOD PRESSURE: 59 MMHG

## 2019-08-15 VITALS — SYSTOLIC BLOOD PRESSURE: 105 MMHG | DIASTOLIC BLOOD PRESSURE: 63 MMHG

## 2019-08-15 VITALS — DIASTOLIC BLOOD PRESSURE: 62 MMHG | SYSTOLIC BLOOD PRESSURE: 110 MMHG

## 2019-08-15 VITALS — SYSTOLIC BLOOD PRESSURE: 114 MMHG | DIASTOLIC BLOOD PRESSURE: 57 MMHG

## 2019-08-15 VITALS — SYSTOLIC BLOOD PRESSURE: 94 MMHG | DIASTOLIC BLOOD PRESSURE: 62 MMHG

## 2019-08-15 VITALS — SYSTOLIC BLOOD PRESSURE: 94 MMHG | DIASTOLIC BLOOD PRESSURE: 39 MMHG

## 2019-08-15 VITALS — DIASTOLIC BLOOD PRESSURE: 55 MMHG | SYSTOLIC BLOOD PRESSURE: 91 MMHG

## 2019-08-15 VITALS — SYSTOLIC BLOOD PRESSURE: 121 MMHG | DIASTOLIC BLOOD PRESSURE: 66 MMHG

## 2019-08-15 VITALS — DIASTOLIC BLOOD PRESSURE: 67 MMHG | SYSTOLIC BLOOD PRESSURE: 114 MMHG

## 2019-08-15 VITALS — DIASTOLIC BLOOD PRESSURE: 66 MMHG | SYSTOLIC BLOOD PRESSURE: 121 MMHG

## 2019-08-15 VITALS — DIASTOLIC BLOOD PRESSURE: 57 MMHG | SYSTOLIC BLOOD PRESSURE: 114 MMHG

## 2019-08-15 VITALS — SYSTOLIC BLOOD PRESSURE: 105 MMHG | DIASTOLIC BLOOD PRESSURE: 60 MMHG

## 2019-08-15 VITALS — SYSTOLIC BLOOD PRESSURE: 116 MMHG | DIASTOLIC BLOOD PRESSURE: 72 MMHG

## 2019-08-15 VITALS — SYSTOLIC BLOOD PRESSURE: 106 MMHG | DIASTOLIC BLOOD PRESSURE: 55 MMHG

## 2019-08-15 VITALS — DIASTOLIC BLOOD PRESSURE: 78 MMHG | SYSTOLIC BLOOD PRESSURE: 124 MMHG

## 2019-08-15 VITALS — DIASTOLIC BLOOD PRESSURE: 76 MMHG | SYSTOLIC BLOOD PRESSURE: 109 MMHG

## 2019-08-15 VITALS — DIASTOLIC BLOOD PRESSURE: 72 MMHG | SYSTOLIC BLOOD PRESSURE: 117 MMHG

## 2019-08-15 LAB
BASOPHILS NFR BLD: 0 % (ref 0–2)
BUN SERPL-MCNC: 21 MG/DL (ref 7–18)
CALCIUM SERPL-MCNC: 7.8 MG/DL (ref 8.5–10.1)
CHLORIDE SERPL-SCNC: 107 MMOL/L (ref 98–107)
CO2 SERPL-SCNC: 28.8 MMOL/L (ref 21–32)
CREAT SERPL-MCNC: 0.6 MG/DL (ref 0.6–1)
ERYTHROCYTE [DISTWIDTH] IN BLOOD BY AUTOMATED COUNT: 17.9 % (ref 11.5–14.5)
GFR SERPLBLD BASED ON 1.73 SQ M-ARVRAT: (no result) ML/MIN
GLUCOSE SERPL-MCNC: 135 MG/DL (ref 74–106)
MAGNESIUM SERPL-MCNC: 1.7 MG/DL (ref 1.8–2.4)
PHOSPHATE SERPL-MCNC: 1.9 MG/DL (ref 2.5–4.9)
PLATELET # BLD: 158 X10^3MCL (ref 130–400)
POTASSIUM SERPL-SCNC: 2.9 MMOL/L (ref 3.5–5.1)
SODIUM SERPL-SCNC: 143 MMOL/L (ref 136–145)

## 2019-08-15 NOTE — NUR
DR. PETIT AT BEDSIDE TO ASSESS PT. UPDATES PROVIDED, QUESTIONS ANSWER. NO
CHANGES IN PLAN AT THIS TIME, JUST TO KEEP DOING CPAP AS PT TOLERATES. WILL
CONTINUE TO MONITOR.

## 2019-08-15 NOTE — NUR
RECIEVED CALL FROM DR. PETIT AND PROVIDED UPDATES. DR. PETIT AWARE PT
CURRENTLY ON CPAP AND STATED TO WEAN IN ABOUT AN HOUR. RT RENUKA AT BEDSIDE
AND MADE AWARE.

## 2019-08-15 NOTE — NUR
PATIENT CARE AND REPORT TAKEN FROM EDUARDO. PATIENT FOUND IN BED INTUBATED.
PATIENT IS ALERT AND ORIENTED AND IS ABLE TO FOLLOW COMMANDS HOWEVER IS NON
VERBAL DUE TO ETT. PATIENT DENIES ANY PAIN. PT HAS 7.5 ETT 20 AT LL. PEG TUBE
IN PLACE AND FC DRAINING TO GRAVITY. WILL CONTINUE TO MONITOR PATIENT.

## 2019-08-15 NOTE — NUR
Initial Nutrition Assessment: IC03/A [place] IA HR
 
Dx: ALOC, respiratory failure
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
HTN, DM, aortic stenosis, end stage heart failure, cardiomegaly, A. fib,
tricuspid insufficiency, and anemia
PSHx: not documented
Labs: BG 135H, BUN 21H, K 2.9L, MG 1.7L, P 1.9L
Meds: D 50%, humulin, Lasix, Lipitor, morphine, vancomycin, zofran
Diet: TF Vital AF 1.2 @ 10 ml/hr, goal 35 ml/hr,  cc Q2H
PO Intake: NPO
Ht: 157.48cm (62")   Wt: 66.6 kg (147#)  BMI: 26.9 kg/m2   Bed scale: 66.6 kg
IBW: 110# (50 kg) %IBW: 133  UBW: unable to access
Age: 79/F
Food Allergies: Iodine
Skin: ecchymosis BUE, darkened skin BLE, Ulcer on sacrum  [place]: 12
Edema: +1 BLE
GI: scant watery x 1 Last BM: 8/15
 
Per H&P, Pt is a 78 yo female with a PMH of Pulmonary HTN, aortic stenosis,
end stage heart failure, cardiomegaly, A. fib, tricuspid insufficiency, and
anemia who was brought to the ED from [facility] after being found unresponsive.
 
RDN Visit (8/15): Patient was intubated and on vent. Patient was not sedated
and was responsive through gestures. Vital AF 1.2 was running @ 35 ml/hr, FWF
100 cc Q2H. Per Dr. Sakina Moeller decreased FWF to 100 cc Q6H due to
heart failure, cardiac problems.
 
Problem with:  N/V/D/C: scant watery BM yesterday and today per ABRAN Gordillo
Problems with: Chewing/Swallowing:  unable to access
Current appetite: unable to access
Recent wt change: unable to access  %wt change: n/a
Vitamin/Supplement use: unable to access
Special diet at home: unable to access
Physical activity: unable to access
Nutrition education given: not appropriate at this time as pt. is intubated
Food-drug interactions: lipitor- avoid grapefruit Education given: no
 
Estimated Nutritional Needs Based on body actual weight 66.6 kg
 Energy: 1239 vs 8004-3802 kcal/d  (PSU 2003b vs 25-30kcal/kg)
 Protein: 80-86 g/d (1.2-1.3 g/kg)- geriatric maintenance
 Fluid:  per doctor as pt has complex cardiac problems
 
Nutrition Diagnosis
1. Inadequate enteral nutrition infusion related to low TF rate as evidenced
by current tube feeding rate 35ml/hr meeting <75% of estimated calorie and
protein needs.
 
Intervention
1. Recommend increasing Vital AF 1.2 to goal of 50ml/hr, frequency of
advancement 10 ml Q4H. FWF per MD (100cc Q6H). This will provide 1440 kcal and
90g protein. This will meet >75% of estimated calorie and protein needs of the
patient.
Discussed recommendations with Dr. Mario Boone.
 
Monitor/Evaluate
 Goal: PO intake at least 75% of estimated needs
 Monitor: PO intake, Labs, GI function
 F/U in 2-3 days as high risk 8/17-18

## 2019-08-15 NOTE — NUR
DR. NUNEZ AT BEDSIDE. UPDATES PROVIDED, QUESTIONS ADDRESSED. NO CHANGES
RECOMMENDED AT THIS TIME. WILL CONTINUE TO MONITOR PT.

## 2019-08-15 NOTE — NUR
DR. DOS SANTOS, RESIDENTS, CHARGE RN AND PRIMARY RN AT BEDSIDE FOR MORNING
ROUNDS. PLAN OF CARE DISCUSSED, PT TO ATTEMPT WEANING TODAY. WILL CONT TO
MONITOR.

## 2019-08-15 NOTE — NUR
RECEIVED CALL BACK FROM DR. SKINNER, REQUESTED ALTERING THE FREQUENCY OF FWF
FROM 100 CC Q2H DOWN TO  CC Q6H AS DISCUSSED IN ROUNDS. DR. SKINNER
SAID HE WOULD EDIT THE ORDER. WILL CARRY OUT NEW ORDER AND MONITOR PT.

## 2019-08-15 NOTE — NUR
DR. SKINNER PAGED REGARDING REDUCING THE FREQUENCY OF FWF PER PEG TUBE AS
DISCUSSED DURING AM ROUNDS.

## 2019-08-15 NOTE — NUR
1. Recommend increasing Vital AF 1.2 to goal of 50ml/hr, frequency of
advancement 10 ml Q4H. FWF per MD (100cc Q6H). This will provide 1440 kcal and
90g protein. This will meet >75% of estimated calorie and protein needs of the
patient.
Discussed recommendations with Dr. Mario Boone.

## 2019-08-15 NOTE — NUR
TOTAL BED BATH PROVIDED WITH CHG WIPES, F/C CARE PROVIDED, LINENS CHANGED.
REPOSITIONED AT THIS TIME.

## 2019-08-15 NOTE — NUR
DR. DOS SANTOS AND RESIDENTS AT BEDSIDE FOR ROUNDS. UPDATES PROVIDED,
QUESTIONS ANSWERED. BEDSIDE RN ASKED ABOUT THE VOLUME AND FREQUENCY OF FREE
WATER FLUSHES PER PEG TUBE SINCE IT SEEMED LIKE A HIGH VOLUME CONSIDERING
PT'S HEART FAILURE. DR. DOS SANTOS SUGGESTED TURNING THE FWF DOWN  ML Q6
FROM 100 ML Q2. WILL CARRY OUT NEW ORDERS.

## 2019-08-16 VITALS — DIASTOLIC BLOOD PRESSURE: 78 MMHG | SYSTOLIC BLOOD PRESSURE: 126 MMHG

## 2019-08-16 VITALS — SYSTOLIC BLOOD PRESSURE: 103 MMHG | DIASTOLIC BLOOD PRESSURE: 65 MMHG

## 2019-08-16 VITALS — DIASTOLIC BLOOD PRESSURE: 54 MMHG | SYSTOLIC BLOOD PRESSURE: 103 MMHG

## 2019-08-16 VITALS — DIASTOLIC BLOOD PRESSURE: 63 MMHG | SYSTOLIC BLOOD PRESSURE: 109 MMHG

## 2019-08-16 VITALS — DIASTOLIC BLOOD PRESSURE: 70 MMHG | SYSTOLIC BLOOD PRESSURE: 108 MMHG

## 2019-08-16 VITALS — SYSTOLIC BLOOD PRESSURE: 130 MMHG | DIASTOLIC BLOOD PRESSURE: 72 MMHG

## 2019-08-16 VITALS — DIASTOLIC BLOOD PRESSURE: 76 MMHG | SYSTOLIC BLOOD PRESSURE: 113 MMHG

## 2019-08-16 VITALS — DIASTOLIC BLOOD PRESSURE: 67 MMHG | SYSTOLIC BLOOD PRESSURE: 106 MMHG

## 2019-08-16 VITALS — SYSTOLIC BLOOD PRESSURE: 131 MMHG | DIASTOLIC BLOOD PRESSURE: 83 MMHG

## 2019-08-16 VITALS — SYSTOLIC BLOOD PRESSURE: 123 MMHG | DIASTOLIC BLOOD PRESSURE: 80 MMHG

## 2019-08-16 VITALS — SYSTOLIC BLOOD PRESSURE: 119 MMHG | DIASTOLIC BLOOD PRESSURE: 71 MMHG

## 2019-08-16 VITALS — DIASTOLIC BLOOD PRESSURE: 67 MMHG | SYSTOLIC BLOOD PRESSURE: 114 MMHG

## 2019-08-16 VITALS — DIASTOLIC BLOOD PRESSURE: 51 MMHG | SYSTOLIC BLOOD PRESSURE: 97 MMHG

## 2019-08-16 VITALS — DIASTOLIC BLOOD PRESSURE: 72 MMHG | SYSTOLIC BLOOD PRESSURE: 105 MMHG

## 2019-08-16 VITALS — DIASTOLIC BLOOD PRESSURE: 36 MMHG | SYSTOLIC BLOOD PRESSURE: 105 MMHG

## 2019-08-16 VITALS — SYSTOLIC BLOOD PRESSURE: 111 MMHG | DIASTOLIC BLOOD PRESSURE: 72 MMHG

## 2019-08-16 LAB
APPEARANCE SPUN FLD: (no result)
BASOPHILS NFR BLD: 0.4 % (ref 0–2)
BODY FLD TYPE: (no result)
BUN SERPL-MCNC: 16 MG/DL (ref 7–18)
CALCIUM SERPL-MCNC: 7.8 MG/DL (ref 8.5–10.1)
CHLORIDE SERPL-SCNC: 108 MMOL/L (ref 98–107)
CO2 SERPL-SCNC: 30.6 MMOL/L (ref 21–32)
COLOR FLD: (no result)
CREAT SERPL-MCNC: 0.5 MG/DL (ref 0.6–1)
ERYTHROCYTE [DISTWIDTH] IN BLOOD BY AUTOMATED COUNT: 18.3 % (ref 11.5–14.5)
GFR SERPLBLD BASED ON 1.73 SQ M-ARVRAT: (no result) ML/MIN
GLUCOSE SERPL-MCNC: 142 MG/DL (ref 74–106)
LYMPHOCYTES NFR FLD MANUAL: 78 %
MAGNESIUM SERPL-MCNC: 2 MG/DL (ref 1.8–2.4)
MONOCYTES NFR FLD MANUAL: 8 %
PHOSPHATE SERPL-MCNC: 1.8 MG/DL (ref 2.5–4.9)
PLATELET # BLD: 154 X10^3MCL (ref 130–400)
POTASSIUM SERPL-SCNC: 3.4 MMOL/L (ref 3.5–5.1)
RBC # FLD MANUAL: (no result) /CUMM
SODIUM SERPL-SCNC: 143 MMOL/L (ref 136–145)
WBC # FLD MANUAL: 1940 /CUMM

## 2019-08-16 PROCEDURE — 0W9B3ZZ DRAINAGE OF LEFT PLEURAL CAVITY, PERCUTANEOUS APPROACH: ICD-10-PCS | Performed by: RADIOLOGY

## 2019-08-16 NOTE — NUR
PT SLEEPING IN NAD, APPEARS COMFORTABLE ON THE VENT. ETT SECURED. VENT ALARMS
AND PARAMETERS VERIFIED. HHN TXS GIVEN. HME CHANGED. PT PLACED ON CPAP
FOLLOWING BREATHING TXS WITHOUT INCIDENCE.

## 2019-08-16 NOTE — NUR
THORACENTESIS COMPLETED WITH 450ML OUTPUT REPORTED BY DONA MENDOZA RN. BP
101/70, HR 78, RR 20, O2 SAT 99%. PATIENT DENIES PAIN AT THIS TIME.

## 2019-08-16 NOTE — NUR
DR. CHAVEZ IS AT BEDSIDE TO SEE THE PATIENT AND UPDATING THE POC TO THE PATIENT
AND HER DAUGHTER, CONG. BOTH GESTURE UNDERSTANDING.

## 2019-08-16 NOTE — NUR
PERFORMED A FEW WEANING PARAMETERS ON THE PT ON CPAP. MIP WAS -36.7, BEST VC I
COULD OBTAIN WAS 535ML, HOWEVER PT HAS NO CUFF LEAK WHEN BALLOON IS FULLY
DEFLATED. CHARGE RN UPDATED. WILL CONTINUE TO MONITOR.

## 2019-08-16 NOTE — NUR
PATIENT REMAINS INTUBATED WITH NO SEDATION. PATIENT OPENS EYES SPONTANEOUSLY
AND FOLLOWS COMMANDS. PATIENT COMMUNICATES BY GESTURING. EARL PUPILS WITH BRISK
REACTION TO LIGHT. ETT 7.5 SECURED AT 20CM AT LIPLINE. ETT TO VENT VIA VCV/AC
MODE: FIO2 30%, RATE 14,  AND PEEP 5. CVC AT RIJ. IVF NS AT 10ML/HR.
ANOTHER IV SITE TO RIGHT HAND.TELE # 3 READS AFIB AT THIS TIME.  PEG TUBE IN
PLACE TO TUBE FEEDING WITH VITAL AF AT 35ML/HR AND FREE WATER FLUSH AT
100ML/Q6HR. NO RESIDUAL AT THIS TIME. DIAZ CATH TO GRAVITY DRAINING YELLOW
URINE. CALL LIGHT WITHIN REACH. SIDE RAILS UP X3. BED IS AT LOWEST POSITION,
AND ALARM IS ON. HEAD OF BED ELEVATED. EXTREMITIES ELEVATED ON PILLOWS. ISOGEL
MATTRESS IN USE.

## 2019-08-16 NOTE — NUR
PATIENT WAS GIVEN A BED BATH; LINEN AND GOWN CHANGED. CVC SITE WAS CLEANSED
AND CHANGED THE DRESSING WITH ASEPTIC TECHNIQUE. THE WOUND CARE AT SACRUM
IMPLEMENTED AS ORDERED BEFORE NEW OPTIFOAM APPLIED.

## 2019-08-16 NOTE — NUR
DR PETIT BEDSIDE - DR WANTED CUFF PRESSURE REDUCED TO ZERO TO SEE IF CUFF
LEAK DEVELOPS. CUFF DEFLATED, RN NOTIFIED.

## 2019-08-17 VITALS — DIASTOLIC BLOOD PRESSURE: 83 MMHG | SYSTOLIC BLOOD PRESSURE: 125 MMHG

## 2019-08-17 VITALS — SYSTOLIC BLOOD PRESSURE: 104 MMHG | DIASTOLIC BLOOD PRESSURE: 68 MMHG

## 2019-08-17 VITALS — DIASTOLIC BLOOD PRESSURE: 68 MMHG | SYSTOLIC BLOOD PRESSURE: 114 MMHG

## 2019-08-17 VITALS — SYSTOLIC BLOOD PRESSURE: 114 MMHG | DIASTOLIC BLOOD PRESSURE: 70 MMHG

## 2019-08-17 VITALS — DIASTOLIC BLOOD PRESSURE: 75 MMHG | SYSTOLIC BLOOD PRESSURE: 118 MMHG

## 2019-08-17 VITALS — DIASTOLIC BLOOD PRESSURE: 80 MMHG | SYSTOLIC BLOOD PRESSURE: 123 MMHG

## 2019-08-17 VITALS — DIASTOLIC BLOOD PRESSURE: 76 MMHG | SYSTOLIC BLOOD PRESSURE: 114 MMHG

## 2019-08-17 VITALS — SYSTOLIC BLOOD PRESSURE: 117 MMHG | DIASTOLIC BLOOD PRESSURE: 72 MMHG

## 2019-08-17 VITALS — DIASTOLIC BLOOD PRESSURE: 67 MMHG | SYSTOLIC BLOOD PRESSURE: 115 MMHG

## 2019-08-17 VITALS — SYSTOLIC BLOOD PRESSURE: 78 MMHG | DIASTOLIC BLOOD PRESSURE: 43 MMHG

## 2019-08-17 VITALS — SYSTOLIC BLOOD PRESSURE: 101 MMHG | DIASTOLIC BLOOD PRESSURE: 57 MMHG

## 2019-08-17 VITALS — SYSTOLIC BLOOD PRESSURE: 112 MMHG | DIASTOLIC BLOOD PRESSURE: 66 MMHG

## 2019-08-17 VITALS — SYSTOLIC BLOOD PRESSURE: 124 MMHG | DIASTOLIC BLOOD PRESSURE: 78 MMHG

## 2019-08-17 VITALS — SYSTOLIC BLOOD PRESSURE: 120 MMHG | DIASTOLIC BLOOD PRESSURE: 80 MMHG

## 2019-08-17 VITALS — SYSTOLIC BLOOD PRESSURE: 99 MMHG | DIASTOLIC BLOOD PRESSURE: 63 MMHG

## 2019-08-17 VITALS — SYSTOLIC BLOOD PRESSURE: 110 MMHG | DIASTOLIC BLOOD PRESSURE: 73 MMHG

## 2019-08-17 LAB
BASOPHILS NFR BLD: 0.3 % (ref 0–2)
BUN SERPL-MCNC: 16 MG/DL (ref 7–18)
CALCIUM SERPL-MCNC: 7.9 MG/DL (ref 8.5–10.1)
CHLORIDE SERPL-SCNC: 107 MMOL/L (ref 98–107)
CO2 SERPL-SCNC: 32.4 MMOL/L (ref 21–32)
CREAT SERPL-MCNC: 0.5 MG/DL (ref 0.6–1)
ERYTHROCYTE [DISTWIDTH] IN BLOOD BY AUTOMATED COUNT: 17.9 % (ref 11.5–14.5)
GFR SERPLBLD BASED ON 1.73 SQ M-ARVRAT: (no result) ML/MIN
GLUCOSE SERPL-MCNC: 148 MG/DL (ref 74–106)
MAGNESIUM SERPL-MCNC: 1.8 MG/DL (ref 1.8–2.4)
PHOSPHATE SERPL-MCNC: 2.5 MG/DL (ref 2.5–4.9)
PLATELET # BLD: 159 X10^3MCL (ref 130–400)
POTASSIUM SERPL-SCNC: 3.8 MMOL/L (ref 3.5–5.1)
SODIUM SERPL-SCNC: 142 MMOL/L (ref 136–145)

## 2019-08-17 NOTE — NUR
RECEIVED REPORT FROM CHANELL OSULLIVAN. ALL QUESTIONS AND CONCERNS ADDRESSED. WILL
RESUME CARE OF PT.

## 2019-08-17 NOTE — NUR
RECEIVED PT INTUBATED WITH NO SEDATION. 7.5 ETT AT 20LL INTACT AND SECURED. PT
OPENS EYES SPONTANEOULSY. ABLE TO FOLLOW SIMPLE COMMANDS AND MAKE NEEDS KNOWN
BY GESTURING AND WRITING. PUPILS 3MM BRISK. PT DENIES ANY PAIN AT THIS TIME.
A. FIB ON CARDIAC MONITOR. BREATHING E/U. VENT ON VCV-AC MODE WITH SETTINGS
OF , FIO2 30%, R 14, PEEP 5. RIJ TRIPLE LUMEN CVC PATENT AND INTACT,
DRESSING CDI. OPTIFOAM IN PLACE TO SACRUM, CDI. ECCHYMOSIS NOTED TO BUE AND
DARK DISCOLORATIONS TO BLE. PEG TUBE IN PLACE WITH VITAL AF 1.2 AT 35CC/HR
AND FWF 100C Q6HRS.  PLACEMENT CHECKED AND 10CC OF RESIDUAL NOTED, REPLACED.
ABDOMEN SOFT, NONTENDER. BS ACTIVE X 4. NO N/V NOTED. DIAZ CATHETER IN PLACE
DRAINING VIA GRAVITY YELLOW URINE. SCD'S IN PLACE TO BLE. BED IN LOW POSITION.
CALL LIGHT WITHIN REACH.  WILL CONTINUE TO MONITOR.

## 2019-08-17 NOTE — NUR
ALL LINENS AND GOWN CHANGED. PT CLEANED AND DIAZ CATHETER CARE PROVIDED. PT
FLOATED ON PILLOWS AND BONY PROMINENCES OFFLOADED. SCD'S IN PLACE TO BLE.
650CC OF YELLLOW URINE OUTPUT NOTED FOR SHIFT. NO BM. CHG WIPES TO [facility] CVC
SITE. BED IN LOW POSITION. CALL LIGHT WITHIN REACH.

## 2019-08-17 NOTE — NUR
PT RESTING BUT EASILY AROUSABLE. PT REMAINS INTUABTED AND ON CPAP AT THIS
TIME. VS STABLE.  SYMMETRICAL CHEST WALL EXPANSION NOTED. NO ACUTE SIGNS OF
DISTRESS NOTED. RIJ TLC INTACT, X3 PORTS PATENT, DRESSING CDI. PEG TUBE TO L
ABD INTACT WITH VITAL AF INFUSING @ 35 ML/HR WITH 100CC FWF Q6H. TOLERATING
WELL. OPTIFOAM TO SACRAL IN PLACE. ISOGEL MATTRESS IN USE, BED IN LOWEST
POSITION, X3 SIDE RAILS UP, HOB 30 DEGREES.

## 2019-08-17 NOTE — NUR
RECEIVED PT INTUBATED WITH NO SEDATION. PERRL. ABLE TO MAKE NEEDS KNOWN BY
GESTURING AND NODDING. RIJ CVC INTACT, X3 PORTS PATENT, DRESSING CDI. TRACHEA
MIDLINE. NO JVD PRESENT. 7.5 ETT @ 23 LL. ETT TO VENT: VCV/AC MODE = 5 PEEP,
30% FIO2, 14 RATE, 400 VT. BREATHING E/U. SYMM CHEST WALL EXPANSION NOTED. NO
ADVENTITIOUS BREATH SOUNDS AUSCULTATED. AFIB ON MONITOR. PEG TUBE TO L ABD
NOTED. VITAL AF INFUSING @ 35 ML/HR. ABD IS SOFT, SYMM, ROUNDED, NONTENDER.
ACTIVE BOWEL SOUNDS. F/C INTACT AND DRAINING VIA GRAVITY. URINE IS YELLOW IN
COLOR WITH FAIR OUTPUT. NO VAGINAL BLEEDING OR DISCHARGE NOTED. ULCER TO
SACRAL AREA WITH ZGUARD AND OPTIFOAM IN PLACE. BLE DARK DISCOLORATION NOTED.
ECCHYMOSIS TO BUE NOTED. SKIN IS WARM/DRY TO TOUCH, GRANADOS/BROWN IN COLOR. FALL
PRECAUTIONS IN PLACE, BED IN LOWEST POSITION, X3 SIDE RAILS UP, ISOGEL
MATTRESS IN USE.

## 2019-08-18 VITALS — SYSTOLIC BLOOD PRESSURE: 113 MMHG | DIASTOLIC BLOOD PRESSURE: 53 MMHG

## 2019-08-18 VITALS — SYSTOLIC BLOOD PRESSURE: 112 MMHG | DIASTOLIC BLOOD PRESSURE: 64 MMHG

## 2019-08-18 VITALS — DIASTOLIC BLOOD PRESSURE: 74 MMHG | SYSTOLIC BLOOD PRESSURE: 116 MMHG

## 2019-08-18 VITALS — DIASTOLIC BLOOD PRESSURE: 59 MMHG | SYSTOLIC BLOOD PRESSURE: 100 MMHG

## 2019-08-18 VITALS — SYSTOLIC BLOOD PRESSURE: 119 MMHG | DIASTOLIC BLOOD PRESSURE: 75 MMHG

## 2019-08-18 VITALS — DIASTOLIC BLOOD PRESSURE: 78 MMHG | SYSTOLIC BLOOD PRESSURE: 109 MMHG

## 2019-08-18 VITALS — SYSTOLIC BLOOD PRESSURE: 113 MMHG | DIASTOLIC BLOOD PRESSURE: 56 MMHG

## 2019-08-18 VITALS — DIASTOLIC BLOOD PRESSURE: 77 MMHG | SYSTOLIC BLOOD PRESSURE: 124 MMHG

## 2019-08-18 VITALS — DIASTOLIC BLOOD PRESSURE: 60 MMHG | SYSTOLIC BLOOD PRESSURE: 111 MMHG

## 2019-08-18 VITALS — SYSTOLIC BLOOD PRESSURE: 11 MMHG | DIASTOLIC BLOOD PRESSURE: 64 MMHG

## 2019-08-18 VITALS — SYSTOLIC BLOOD PRESSURE: 118 MMHG | DIASTOLIC BLOOD PRESSURE: 66 MMHG

## 2019-08-18 LAB
BASOPHILS NFR BLD: 0.2 % (ref 0–2)
BUN SERPL-MCNC: 16 MG/DL (ref 7–18)
CALCIUM SERPL-MCNC: 7.7 MG/DL (ref 8.5–10.1)
CHLORIDE SERPL-SCNC: 106 MMOL/L (ref 98–107)
CO2 SERPL-SCNC: 33.4 MMOL/L (ref 21–32)
CREAT SERPL-MCNC: 0.5 MG/DL (ref 0.6–1)
ERYTHROCYTE [DISTWIDTH] IN BLOOD BY AUTOMATED COUNT: 18.2 % (ref 11.5–14.5)
GFR SERPLBLD BASED ON 1.73 SQ M-ARVRAT: (no result) ML/MIN
GLUCOSE SERPL-MCNC: 206 MG/DL (ref 74–106)
MAGNESIUM SERPL-MCNC: 1.7 MG/DL (ref 1.8–2.4)
PHOSPHATE SERPL-MCNC: 2.5 MG/DL (ref 2.5–4.9)
PLATELET # BLD: 160 X10^3MCL (ref 130–400)
POTASSIUM SERPL-SCNC: 3.4 MMOL/L (ref 3.5–5.1)
SODIUM SERPL-SCNC: 142 MMOL/L (ref 136–145)

## 2019-08-18 NOTE — NUR
PATIENT AWAKE; OPENS EYES SPONTANEOUSLY AND COMMUNICATES BY GESTURING. PAITENT
FOLLOWS SIMPLE COMMANDS. EARL PUPILS WITH BRISK REACTION TO LIGHT. ETT 7.5
SECURED TO FACE AND AT 20CM AT LIPLINE. ETT TO VENT VIA VCV/AC MODE: FIO2 30%,
RATE 14,  AND PEEP 5. CVC TO RIJ WITH DRESSING INTACT. PEG TUBE TO UPPER
ABDOMEN CONNECTED TO TUBE FEEDING WITH VITAL AF AT 35ML/HR. AND FREE WATER
FLUSH AT 100ML/Q6HR. NO RESIDUAL AT THIS TIME. DAIZ CATH TO GRAVITY DRAINING
YELLOW URINE OUTPUT. CALL LIGHT WITHIN REACH. SIDE RAILS UP X3. BED AT LOWEST
POSITION. HEAD OF BED ELEVATED. BLE AND RUE ELEVATED. ISOGEL MATTRESS IN USE.

## 2019-08-18 NOTE — NUR
ASSESSED PT. NAD NOTED. BREATHING E/U. PT DENIES ANY PAIN AT THIS TIME. VAP
CARE PROVIDED. PT CALM RESTING COMFORTABLY IN BED. BED IN LOW POSITION. CALL
LIGHT WITHIN REACH.  WILL CONTINUE TO MONITOR.

## 2019-08-18 NOTE — NUR
THE PATIENT IS EXTUBATED BY RT ANN AT THIS TIME. PATIENT IS ON OXYGEN AT
2L/MIN VIA NASAL CANNULA WITH O2 %. PATIENT IS INSTRUCTED NOT TO TALK
FOR A COUPLE OF HOURS TO PROTECT HER VOCAL CORD; SHE GESTURES UNDERSTANDING.

## 2019-08-18 NOTE — NUR
Intervention/RDN Recommendation(s):
1. Continue TF Vital AF 1.2 at 35 mL/hr (with goal of 50 mL/hr),
FWF per MD to provide 1440 kcal and
90 gm protein to meet > 75% estimated needs.

## 2019-08-18 NOTE — NUR
Follow-up Nutrition Assessment-
Dx: ALOC, respiratory failure
Labs: (08/18) Na 142, K 3.4, Glu 206 H, BUN 16, Cr 0.5 L, A1c 5.3, Hgb 9.1,
Hct 29
Meds: D50%/water, humulin R, Lasix, Levequin, Lipitor, Morphine sulfate,
Potassium chloride, Prilosec, Solu-medrol, Vancocin, Zofran
Current Nutrition Support: TF Vital AF 1.2  at 50 mL/hr via NGT.
 
TF Intake: (08/18) 900 mL, (08/17) 796 mL, (08/16) 929 mL
 
GTF Residuals:   0 mL
 
Weights: (08/15) 147#/66.6 kg, (08/18) 159#/72.2 kg
Skin: ecchymosis BUE, darkened skin BLE, ulcer on sacrum
Edema: +1 BLE
Last BM: 08/15/19 x 1
Current TF Regimen provides 1200 mL total volume, 1440 total kcal, 90 total
protein.
 
RDN visited Pt. Vital AF 1.2 seen running at 35 mL/hr. Per discussion with RN,
Pt is tolerating tube feedings well, may try extubating Pt soon. If Pt is able
to be extubated, TF may be increased.
 
Estimated Nutritional Needs unchanged from prior assessment:
Energy: 1239 vs 4299-5593 kcal/d (PSU 2003b vs 25-30 kcal/kg)
Protein: 80-86 gm/d (1.2-1.3 gm/kg) - geriatric maintenance
Fluid: per doctor as Pt has complex cardiac problems
Nutrition Diagnosis
1. Inadequate enteral nutrition infusion related to low TF rate as evidenced
by current tube feeding 35 mL/hr meeting < 75% estimated calorie and protein
needs. (Resolved)
Intervention/RDN Recommendation(s):
1. Continue TF Vital AF 1.2 at 50 mL/hr, FWF per MD to provide 1440 kcal and
90 gm protein to meet > 75% estimated needs.
Monitor/Evaluate
Goal: Intake via nutrition support to meet at least 80% of estimated needs
with acceptable tolerance within 2-3 days.
Monitor: nutrition support tolerance, Labs, GI function, Skin integrity,
Weights.
 F/U in 2-3 days as high risk (08/20-21)

## 2019-08-19 VITALS — SYSTOLIC BLOOD PRESSURE: 125 MMHG | DIASTOLIC BLOOD PRESSURE: 75 MMHG

## 2019-08-19 VITALS — DIASTOLIC BLOOD PRESSURE: 74 MMHG | SYSTOLIC BLOOD PRESSURE: 116 MMHG

## 2019-08-19 VITALS — SYSTOLIC BLOOD PRESSURE: 120 MMHG | DIASTOLIC BLOOD PRESSURE: 69 MMHG

## 2019-08-19 VITALS — SYSTOLIC BLOOD PRESSURE: 11 MMHG | DIASTOLIC BLOOD PRESSURE: 75 MMHG

## 2019-08-19 VITALS — SYSTOLIC BLOOD PRESSURE: 134 MMHG | DIASTOLIC BLOOD PRESSURE: 68 MMHG

## 2019-08-19 VITALS — SYSTOLIC BLOOD PRESSURE: 110 MMHG | DIASTOLIC BLOOD PRESSURE: 71 MMHG

## 2019-08-19 LAB
BASOPHILS NFR BLD: 0.3 % (ref 0–2)
BUN SERPL-MCNC: 18 MG/DL (ref 7–18)
CALCIUM SERPL-MCNC: 7.9 MG/DL (ref 8.5–10.1)
CHLORIDE SERPL-SCNC: 107 MMOL/L (ref 98–107)
CO2 SERPL-SCNC: 35.7 MMOL/L (ref 21–32)
CREAT SERPL-MCNC: 0.5 MG/DL (ref 0.6–1)
ERYTHROCYTE [DISTWIDTH] IN BLOOD BY AUTOMATED COUNT: 17.6 % (ref 11.5–14.5)
GFR SERPLBLD BASED ON 1.73 SQ M-ARVRAT: (no result) ML/MIN
GLUCOSE SERPL-MCNC: 126 MG/DL (ref 74–106)
MAGNESIUM SERPL-MCNC: 1.8 MG/DL (ref 1.8–2.4)
PHOSPHATE SERPL-MCNC: 2.5 MG/DL (ref 2.5–4.9)
PLATELET # BLD: 171 X10^3MCL (ref 130–400)
POTASSIUM SERPL-SCNC: 3.7 MMOL/L (ref 3.5–5.1)
SODIUM SERPL-SCNC: 142 MMOL/L (ref 136–145)

## 2019-08-19 NOTE — NUR
PT RECEIVING PT EVAL AT THIS TIME. BLOOD SUGAR 111, NO INSULIN COVERAGE
INDICATED. PT DENIES PAIN. RESP EVEN AND UNLABORED. NO DISTRESS NOTED.

## 2019-08-19 NOTE — NUR
PT IS A AAOX3-4. CONFUSED AND NONRESPONSIVE AT TIMES. RESP EVEN AND UNLABORED.
NO RESP DISTRESS NOTED. ON 02 N/C AT 2LPM. NO COUGH OR SOB NOTED. TELE 10 IN
PL[address] READING A-FIB WITH DEPRESSED T WAVE. RIJ CENTRAL LINE IN PLACE, ALL
PORTS PATENT. COVERED WITH [employer] DRESSING. PT HAS SACRAL COCCYX WOUND COVERED
WITH Z-GUARD AND CDI OPTIFOAM DRESSING. AIR MATRESS IN PLACE. PT GTUBE COVERED
WITH CD[employer] DRESSING. TUBE FEEDING VITAL AF RUNNING AT 35ML/HR WITH 100ML WATER
FLUSH 1 6 HOURS, TUBE IS PATENT, SITE IS COVERED WITH CDI DRESSING. PT DENIES
PAIN AND DISCOMFORT AT THIS TIME. CALL LIGHT WITHIN REACH. BED IN LOWEST
POSITION. FALL PROTOCOL FOLLOWED. PT TURNED AND REPOSITIONED Q 2 HOURS. WILL
ENDORE ALL CARE TO NOC RN.

## 2019-08-19 NOTE — NUR
TRANSFERRED PT TO TELE BED. NO COMPLICATIONS AT THIS TIME. INFORMED MARGARITA
(DAUGHTER) OF PT BEING TRANSFERRED UPSTAIRS. UPDATES PROVIDED. ALL QUESTIONS
AND CONCERNS ADDRESSED AT THIS TIME. PT WILL BE TRANSFERRED ACCOMPANIED BY
TATA OSULLIVAN WITH ALL PT'S MEDICATIONS, FEEDING, PT'S BELONGINGS, AND CHART.

## 2019-08-19 NOTE — NUR
REPORT GIVEN TO KERMIT OSULLIVAN. ALL QUESTIONS AND CONCERNS ADDRESSED. PT WILL BE
GOING TO [facility] VIA BED.

## 2019-08-19 NOTE — NUR
DR. DOS SANTOS AND RESIDENTS AT BEDSIDE. ALL QUESTIONS AND CONCERNS ADDRESSED.
PT IS ABLE TO GO UPSTAIRS.

## 2019-08-19 NOTE — NUR
STEVEN IS HERE, ALL UPDATES GIVEN, DR HARRSI MADE AWARE OF PT'S LEVAQUIN 250MG
IVPB DAILY TREATMENT IS COMPLETED WITH TOTAL 7 DOSES, PER DR HARRIS THAT PT
NEEDS ANOTHER 3 DOSES OF LEVAQUIN, ORDER ENTERED AND CARRIED OUT.

## 2019-08-19 NOTE — NUR
RECEIVED PT FROM ICU, REPORT GIVEN BY TATA. RESP EVEN AND UNLABORED. LUNG
SOUNDS DIMINISHED BILATERAL BASES. ON 02 N/C AT 2 LPM. TELE 10 IN PLACE
READING NSR. ABDOMEN SOFT, NONTENDER, NONDISTENDED. BOWEL SOUNDS ACTIVE X4.
DENIES N/V/D. PEG TUBE TO RIGHT MID ABDOMEN, PATENT WITH VITAL AF RUNNING AT
35ML/HOUR AND 100ML FLUSHED Q 6 HOURS[address] PLACE NEW 4X4 DRESSING ON PEG TUBE AND
WITH PAPER TAPE. SITE WNL. NO S/S OF INFECTION NOTED.  PT HOB ELEVATED AT 45
DEGREES. DIAZ CATH IN PLACE, PATENT, DRAINING CLEAR YELLOW URINE TO GRAVITY,
STAT LOCK TO R LEG. 100ML URINE NOTED IN DIAZ BAG. PT HAS RIJ CENTRAL LINE
WITH 3 PORTS, ALL PORTS FLUSHED, PATENT, WITH GOOD BLOOD RETURN. AIR MATRESS
IN PLACE. PT WILL BE TURNED AND REPOSITIONED Q 2 HOURS AND PRN. FALL PROTOCOL
FOLLOWED. PT ORIENTED TO ROOM AND CALL LIGHT. DENIES PAIN AT THIS TIME. CALL
LIGHT WITHIN REACH. BED IN LOWEST POSITION.

## 2019-08-19 NOTE — NUR
DR. PETIT AT BEDSIDE ASSESSING PT. UPDATES PROVIDED. PT TO HAVE BREATHING TX
ON EZPAP. PT TO BE INSTRUCTED TO USE INCENTIVE SPIROMETER EVERY HOUR. WILL
CARRY OUT ORDERS.

## 2019-08-19 NOTE — NUR
BEDIDE HANDOFF REPRT DONE WITH BRANDO, PT SEEN, RESTING IN BED, ALERT AND
ORIENTED X 3 WITH PERIODS OF FORGETFUL AND CONFUSION, SLOW SPEECH, DENIES
HEADACHE OR DIZZINESS, BREATHING EVEN AND UNLABORED, LUNG SOUNDS CLEAR ON BUL
AND DIMINISHED ON BLL, ON O2 2L VIA NC WITH NO RESP DISTRESS NOTED, RT
PROTOCOL, MILD SOB ON EXERTION, PULSES PALPABLE, EDEMA NOTED TO BLE,
GENERALIZED WEAKNESS, TOTAL CARE, ON AIR MATTRESS, ON TF WITH VITAL AF 1.2 @
35 ML/HR, NO RESIDUAL NOTED, HOB AND ASP PRECAUTION IN PLACE, FWF 100ML Q6HRS,
GOAL WITH TF IS 50ML/HR, ABD SOFT WITH ACTIVE BS, NO BM AT THIS TIME,
INCONTINENT OF BM, DIAZ VIA GRAVITY DRAINING YELLOW URINE, CENTRAL LINE TO
RIJ, DRESSING C/D/I, RE-POSITION PT Q2HRS, OPTIFORM TO SACRAL AREA, PT'S
FAMILY AT BEDSIDE, NO DISTRESS NOTED, WILL KEEP TO MONITOR.

## 2019-08-19 NOTE — NUR
RECEIVED REPORT FROM CHANELL OSULLIVAN. UPDATES PROVIDED. ALL QUESTIONS AND CONCERNS
ADDRESSED. WILL RESUME CARE OF PT.

## 2019-08-20 VITALS — SYSTOLIC BLOOD PRESSURE: 126 MMHG | DIASTOLIC BLOOD PRESSURE: 73 MMHG

## 2019-08-20 VITALS — SYSTOLIC BLOOD PRESSURE: 122 MMHG | DIASTOLIC BLOOD PRESSURE: 77 MMHG

## 2019-08-20 VITALS — SYSTOLIC BLOOD PRESSURE: 103 MMHG | DIASTOLIC BLOOD PRESSURE: 59 MMHG

## 2019-08-20 VITALS — DIASTOLIC BLOOD PRESSURE: 63 MMHG | SYSTOLIC BLOOD PRESSURE: 126 MMHG

## 2019-08-20 VITALS — SYSTOLIC BLOOD PRESSURE: 126 MMHG | DIASTOLIC BLOOD PRESSURE: 67 MMHG

## 2019-08-20 LAB
BASOPHILS NFR BLD: 0.1 % (ref 0–2)
BUN SERPL-MCNC: 16 MG/DL (ref 7–18)
CALCIUM SERPL-MCNC: 8.1 MG/DL (ref 8.5–10.1)
CHLORIDE SERPL-SCNC: 103 MMOL/L (ref 98–107)
CO2 SERPL-SCNC: 37.2 MMOL/L (ref 21–32)
CREAT SERPL-MCNC: 0.5 MG/DL (ref 0.6–1)
ERYTHROCYTE [DISTWIDTH] IN BLOOD BY AUTOMATED COUNT: 18 % (ref 11.5–14.5)
GFR SERPLBLD BASED ON 1.73 SQ M-ARVRAT: (no result) ML/MIN
GLUCOSE SERPL-MCNC: 125 MG/DL (ref 74–106)
MAGNESIUM SERPL-MCNC: 1.8 MG/DL (ref 1.8–2.4)
PHOSPHATE SERPL-MCNC: 2.7 MG/DL (ref 2.5–4.9)
PLATELET # BLD: 179 X10^3MCL (ref 130–400)
POTASSIUM SERPL-SCNC: 3.5 MMOL/L (ref 3.5–5.1)
SODIUM SERPL-SCNC: 144 MMOL/L (ref 136–145)

## 2019-08-20 NOTE — NUR
PT ASLEEP BUT EASILY AROUSABLE, SLEPT MOST OF NIGHT, BREATHING EVEN AND
UNLABORED WITH O2 2L VIA NC, RT PROTOCOL, HOB AND ASP PRECAUTION IN PLACE,
RE-POSITIONED PT Q2HRS, ON AIR MATTRESS, MORNING BLOOD SUGAR-116 MG/DL WITH
NO RISS, NO DISTRESS NOTED, WILL KEEP TO MONITOR.

## 2019-08-20 NOTE — NUR
PT WAS SEEN FOR DYSPHAGIA. PT WAS ABLE TO SAFELY SWALLOW PUREE WITH NECTAR
THICK LIQUID. PT HAD MILD COUGH FOR THIN LIQUID.
 
RECOMMENDATION
PUREE DIET WITH THIN LIQUID
SMALL BITES AND SIPS ONLY.

## 2019-08-20 NOTE — NUR
PT IN NO ACUTE RESP DISTRESS, AM MED GIVEN PER MD ORDER VIA EMAR, TAKEN WELL,
NO ASE NOTED AT THIS TIME, ALL NEEDS ADDRESSED AT THIS TIME, CONTINUE TO
MONITOR

## 2019-08-20 NOTE — NUR
1. Recommend continuing Vital AF 1.2 to goal of 50ml/hr, frequency of
advancement 10 ml Q4H. FWF per MD (100cc Q6H). This will provide 1440 kcal and
90g protein. This will meet >75% of estimated calorie and protein needs of the
patient.

## 2019-08-20 NOTE — NUR
Follow-up Nutrition Assessment: 221T/B [facility] [facility] FU HR
 
 Dx: ALOC, respiratory failure
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
HTN, DM, aortic stenosis, end stage heart failure, cardiomegaly, A. fib,
tricuspid insufficiency, and anemia
 Labs: (8/20): BG 125H, CA 8.1L, HGB 10.2L
 Meds: Aldactone, D 50%, humulin, Lasix, Lipitor, vancomycin, zofran
 Diet: TF Vital AF 1.2 @ 10 ml/hr, goal 50 ml/hr,  cc Q6H
 PO Intake: NPO
 Weights in kg: (8/17) 72, (8/18) 72, (8/19) 74, (8/20) 63.7- fluctuation d/t
edema
 Skin: on air mattress, decub to sacral area
Keny: 12
 I/Os: (8/19) 1537/1300 (237)
 Edema: BLE
 GI: Last BM: 8/19
 
RDN Visit (8/20): Patient was alert and oriented but appeared weak. Patient is
receiving Vital AF 1.2 @ 50 ml/hr,  cc Q6H via PEG tube. Per RN Thi,
pt. is tolerating TF with no residuals at this time. Pt. does not have any
N/V.  Patient has stage 2 decubitus ulcer in sacral area. Per progress note
(8/19), Patient was extubated ON 8/18 10:30 am, She admits to feeling better,
Pulse ox droped to 89 yesterday so she was put on 1L nasal canula.
Thoracentesis was done, Pleural effusion was exudate poss 2/2 pneumonia.
Patient is awaiting swallow eval.
 
Estimated Nutritional Needs Based on body actual weight 66.6 kg
 Energy: 8092-8092 kcal/d (25-30kcal/kg)
 Protein: 80-86 g/d (1.2-1.3g/kg)- geriatric maintenance, pressure ulcer
 Fluid:  per doctor as pt has complex cardiac problems
 
Nutrition Diagnosis
1. Inadequate enteral nutrition infusion related to low TF rate as evidenced
by current tube feeding rate 35ml/hr meeting <75% of estimated calorie and
protein needs. (improved- rate increased to 50 ml/hr).
 
Intervention
 
1. Recommend continuing Vital AF 1.2 to goal of 50ml/hr, frequency of
advancement 10 ml Q4H. FWF per MD (100cc Q6H). This will provide 1440 kcal and
90g protein. This will meet >75% of estimated calorie and protein needs of the
patient.
 
Monitor/Evaluate
 Goal: Have pt meet at least 75% of estimated needs
 Monitor: PO intake, Labs, GI function
 F/U in 3-5 days as moderate risk 8/23-25

## 2019-08-20 NOTE — NUR
PT IN BED, ABLE TO MAKE NEEDS KNOWN, IN NO ACUTE RESP DISTRESS, NO SOB/COUGH,
DENIED CP/PRESSURE/HA, DENIED N/V/DIZZINESS, BEDREST, FALL RISK, CONTINENT,
MEDSURG, PEG TUBE PATENT AND INFUSING WELL, CENTRAL LINE TO RIJ FLUSING W/ NO
OBSTRUCTION, DREESING CDI, SKIN C/D/W, ALL NEEDS ADDRESSED AT THIS TIME,
SAFETY PROTOCOL FOLLOWED, WILL ENDORSE TO ONCOMING RN

## 2019-08-20 NOTE — NUR
MADE  KNOW OF SPEECH THERAPIST EVALUATION AND DIET RECOMMENDATION BY
S.T., WILL AWAIT FOR ORDER.
JUANJO RN ASSIGNED TO THIS PT MADE AWARE OF ABOVE.

## 2019-08-20 NOTE — NUR
BEDIDE HANDOFF REPRT DONE WITH JUANJO-RN, PT SEEN, RESTING IN BED, ALERT AND
ORIENTED X 3 WITH PERIODS OF FORGETFUL AND CONFUSION, SLOW SPEECH, DENIES
HEADACHE OR DIZZINESS, BREATHING EVEN AND UNLABORED, LUNG SOUNDS CLEAR ON BUL
AND DIMINISHED ON BLL, ON O2 2L VIA NC WITH NO RESP DISTRESS NOTED, RT
PROTOCOL, MILD SOB ON EXERTION, PULSES PALPABLE, EDEMA NOTED TO BLE,
GENERALIZED WEAKNESS, TOTAL CARE, ON AIR MATTRESS, ON TF WITH VITAL AF 1.2 @
50 ML/HR, NO RESIDUAL NOTED, HOB AND ASP PRECAUTION IN PLACE, FWF 100ML Q6HRS
ABD SOFT WITH ACTIVE BS, NO BM AT THIS TIME, INCONTINENT OF BM, DIAZ VIA
GRAVITY DRAINING YELLOW URINE, CENTRAL LINE TO RIJ, DRESSING C/D/I,
RE-POSITION PT Q2HRS, OPTIFORM TO SACRAL AREA, PT'S FAMILY AT BEDSIDE, NO
DISTRESS NOTED, WILL KEEP TO MONITOR.

## 2019-08-20 NOTE — NUR
PT IN BED, ABLE TO FOLOWED VERBAL COMMAND, DENIED CP/PRESSURE/HA, DENIED
N/V/DIZZINESS, IN NO ACUTE RESP DISTRESS, RESP EVEN, NO SOB/COUGH, DIM
BLL, TELE #10, NSR, HR-65 AT THIS TIME, PALP PULSES, CAP REFILL < 2 SECS,
BEDREST, PEG TUBE PATENT AND INFUSING WELL, CONTINENT, SEE ASSESSMENT, CENTRAL
LINE TO RIJ PATENT AND FLUSING WELL, DRESSING CDI, ALL NEEDS ADDRESSED AT THIS
TIME, SAFETY PROTOCOL FOLLOWED, CONTINUE TO MONITOR

## 2019-08-21 VITALS — DIASTOLIC BLOOD PRESSURE: 77 MMHG | SYSTOLIC BLOOD PRESSURE: 128 MMHG

## 2019-08-21 VITALS — DIASTOLIC BLOOD PRESSURE: 73 MMHG | SYSTOLIC BLOOD PRESSURE: 123 MMHG

## 2019-08-21 VITALS — SYSTOLIC BLOOD PRESSURE: 127 MMHG | DIASTOLIC BLOOD PRESSURE: 74 MMHG

## 2019-08-21 VITALS — DIASTOLIC BLOOD PRESSURE: 78 MMHG | SYSTOLIC BLOOD PRESSURE: 129 MMHG

## 2019-08-21 VITALS — SYSTOLIC BLOOD PRESSURE: 123 MMHG | DIASTOLIC BLOOD PRESSURE: 73 MMHG

## 2019-08-21 LAB
BASOPHILS NFR BLD: 0.2 % (ref 0–2)
BUN SERPL-MCNC: 17 MG/DL (ref 7–18)
CALCIUM SERPL-MCNC: 8.1 MG/DL (ref 8.5–10.1)
CHLORIDE SERPL-SCNC: 102 MMOL/L (ref 98–107)
CO2 SERPL-SCNC: 40.6 MMOL/L (ref 21–32)
CREAT SERPL-MCNC: 0.5 MG/DL (ref 0.6–1)
ERYTHROCYTE [DISTWIDTH] IN BLOOD BY AUTOMATED COUNT: 17.6 % (ref 11.5–14.5)
GFR SERPLBLD BASED ON 1.73 SQ M-ARVRAT: (no result) ML/MIN
GLUCOSE SERPL-MCNC: 140 MG/DL (ref 74–106)
MAGNESIUM SERPL-MCNC: 1.8 MG/DL (ref 1.8–2.4)
PHOSPHATE SERPL-MCNC: 3.1 MG/DL (ref 2.5–4.9)
PLATELET # BLD: 152 X10^3MCL (ref 130–400)
POTASSIUM SERPL-SCNC: 3.2 MMOL/L (ref 3.5–5.1)
SODIUM SERPL-SCNC: 144 MMOL/L (ref 136–145)

## 2019-08-21 NOTE — NUR
PT ASLEEP AND APPEARS COMFORTABLE, BREATHING EVEN AND UNLABORED ON O2 2L VIA
NC, PT HAD TWO LOOSE STOOL DURING THE SHIFT, SACRAL DRESSING CHANGED, Z-GUARD
AND NEW OPTIFORM APPLIED, TF TOLERATING WELL WITH VITAL 1.2 AF @ 50 ML/HR, HOB
AND ASP PRECAUTION IN PLACE, NO DISTRESS NOTED, WILL KEEP TO MONITOR.

## 2019-08-21 NOTE — NUR
REPORT RECEIVED FROM DAY SHIFT RN. PATIENT WAS SEEN AND IS RESTING COMFORTABLY
IN BED. NO DISTRESS NOTED. DROWSY AND LETHARGIC AT THIS TIME. SOFT AND SLOW
SPEECH NOTED. A/OX3 WITH PERIODS OF CONFUSION AND FORGETFULNESS. ABLE TO MAKE
NEEDS KNOWN. MED SURG PATIENT. DENIES CHEST PAIN/PRESSURE. NO C/O PAIN.
BREATHING EVEN AND UNLABORED ON 1L NC. NO SOB OR RESP DISTRESS NOTED.
THREE-LUMEN RIJ NOTED. ALL PORTS PATENT AND INTACT. FLUSHES WELL. NO REDNESS
OR SWELLING NOTED. DRESSING CDI. OPTIFOAM TO SACRAL, CDI. GTUBE NOTED WITH
CONTINUOUS TUBE FEEDING AT 50ML/HR AND FWF 100ML Q6H. TUBE FEEDING RATE AT
GOAL LEVEL. DIAZ CATH IN PLACE DRAINING YELLOW URINE BUY GRAVITY. NO CLOTS OR
KINKS NOTED. COMFORT AND SAFETY MEASURES IN PLACE. BED IS LOCKED AND IN THE
LOWEST. SIDE RAILS UP X2. CALL LIGHT IS WITHIN REACH. WILL CONTINUE TO
MONITOR.

## 2019-08-21 NOTE — NUR
TUBE FEEDING SET CHANGED, NO RESIDUAL NOTED, TOLERATING WELL WITH VITAL 1.2 AF
@ 50 ML/HR, NO DISTRESS NOTED, WILL KEEP TO MONITOR.

## 2019-08-21 NOTE — NUR
SHIFT ASSESSMENT DONE. PATIENT DROWSY, AROUSABLE. ORIENTED TO PERSON. BUT SLOW
TO ANSWER QUESTIONS W/ WEAK VOICE. BREATHING SOUND DIMINISHED EARL BASES. O2
SAT 96% ON 1L VIA N/C. RT PROTOCOL. GT FEEDING WITH VITAL AV 1.2 50CC/HR,
WATER 100CC/Q6H. RESIDUAL CHECK 10CC REPLACED. DIAZ PATIENT W/ ARIANA URINE
OUTPUT. ON AIR MATTRESS. WOUND REPOSITION Q2H. NO S/S OF PAIN. SIDE RAILS UP
X4. 3 PORTS CENTRAL LINE TO RIJ W/ DRSG D/C/I. ALL PORTS PATENT WITH NS FLUSH.
CALL LIGHT IN REACH. CONTINUE MONITOR.

## 2019-08-21 NOTE — NUR
REPOSITION PATIENT. PATIENT HAD A LARGE LOOSE BM. SKIN REDNESS TO PERINEAL
AREA. Z GUARD APPLIED. OPENED SKIN LESION TO COCCYX AREA WITH OPTIFORM DRSG
INTACT. SCD TO BLE'S.  SKIN BROWN DISCOLORATION TO EARL LOWER LEGS.

## 2019-08-21 NOTE — NUR
TOLERATED GT FEEDING. BM X1, LARGE LOOSE. F/C 1400CC. IVHL'D. REPOSITIONED
Q2-3H. NO S/S OF PAIN. ENDORSED CARE TO NOC NURSE.

## 2019-08-22 VITALS — SYSTOLIC BLOOD PRESSURE: 111 MMHG | DIASTOLIC BLOOD PRESSURE: 62 MMHG

## 2019-08-22 VITALS — DIASTOLIC BLOOD PRESSURE: 72 MMHG | SYSTOLIC BLOOD PRESSURE: 103 MMHG

## 2019-08-22 VITALS — DIASTOLIC BLOOD PRESSURE: 63 MMHG | SYSTOLIC BLOOD PRESSURE: 113 MMHG

## 2019-08-22 VITALS — SYSTOLIC BLOOD PRESSURE: 119 MMHG | DIASTOLIC BLOOD PRESSURE: 75 MMHG

## 2019-08-22 LAB
BASOPHILS NFR BLD: 0.2 % (ref 0–2)
BUN SERPL-MCNC: 14 MG/DL (ref 7–18)
CALCIUM SERPL-MCNC: 8.2 MG/DL (ref 8.5–10.1)
CHLORIDE SERPL-SCNC: 104 MMOL/L (ref 98–107)
CO2 SERPL-SCNC: 40.1 MMOL/L (ref 21–32)
CREAT SERPL-MCNC: 0.4 MG/DL (ref 0.6–1)
ERYTHROCYTE [DISTWIDTH] IN BLOOD BY AUTOMATED COUNT: 18.3 % (ref 11.5–14.5)
GFR SERPLBLD BASED ON 1.73 SQ M-ARVRAT: (no result) ML/MIN
GLUCOSE SERPL-MCNC: 108 MG/DL (ref 74–106)
PLATELET # BLD: 156 X10^3MCL (ref 130–400)
POTASSIUM SERPL-SCNC: 3.4 MMOL/L (ref 3.5–5.1)
SODIUM SERPL-SCNC: 146 MMOL/L (ref 136–145)

## 2019-08-22 NOTE — NUR
NOTIFIED DR. SKINNER -- PATIENT HAD PASSED SWALLOW EVAL. DR. SKINNER SAID THAT
HE WOULD ORDER PUREED DIET LATER.

## 2019-08-22 NOTE — NUR
CONDITION STABLE. TOLERATED GT FEEDING. SMEAR BM X1; DIAZ OUTPUT 1500 CC
COLLECTED. CENTRAL LINE DRSG INTACT. ALL PORTS PATENT. REPOSITED Q2H. ENDORSED
CARE TO [facility] NURSE.

## 2019-08-22 NOTE — NUR
SHIFT ASSESSMENT DONE. PATIENT DROWSY AROUSABLE. ORIENTED TO PERSON, BUT
FORGETFULNESS. SLOW TO ANSWER QUESTION WITH SOFT VOICE. BREATHING SOUND
DIMINISHED EARL BASES. O2 SAT 96% ON 1L VIA N/C. HX OF A FIB; API PULSE 73/MIN,
IRREGULAR. G TUBE TO UPPER ABD. SITE CLEAN. NO REDNESS. CARE GIVEN AND DRSG
CHANGED. 3 PORTS CENTRAL LINE TO RIJ. DRSG INTACT. SITE CLEAN. NO REDNESS AND
DRAIANGE. ALL PORTS PATENT WITH NS FLUSH. DIAZ CATH IN PLACE. YELLOW URINE
DRAINING. REDNESS TO PARINEAL AREA SUBSIDED. Z GUARD APPLIED. ULCER TO COCCYX,
DRSG CAHGNED BY SAVITA WOUND CARE NURSE. GT FEEDING WITH VITAL AF 1.2 50CC/HR
AND WATER FLUSH 100CC/Q6H. RESIDUAL CHACK = 20CC, REPLACE. SCD TO BLE. AIR
MATTRESS. NO S/S OF PAIN. CONTINUE MONITOR.

## 2019-08-22 NOTE — NUR
TUBE FEEDING, GTUBE, FORMULA: VITAL AF 1.2 CHANGED. RESIDUALS CHECKED AND 30ML
OF WHITE COLOR RESIDUALS NOTED AND RETURNED. PATIENT TOLERATING TUBE FEEDING
WELL. TUBE FEEDING RESUMED AT 50ML/HR (AT GOAL RATE) W/ FWF AT 100ML Q6H. NO
DISTRESS NOTED. BREATHING EVEN AND UNLABORED ON 1L NC. NO C/O OF PAIN NOTED.
REPOSITIONED TO COMFORT. CALL LIGHT IS WITHIN REACH. WILL CONTINUE TO MONITOR.

## 2019-08-22 NOTE — NUR
WOUND CARE RE-EVALUATION NOTE:
SKIN ASSESSMENT DONE THIS MORNING, SKIN IS WARM AND AT SAME CONDITION,
BLE /FEET DARKER PIGMENTATION AS HER USUAL UPON ADMISSION, NO SKIN TEAR.
POC DISCUSSED WITH PRIMARY RN, WILL CONTINUE SAME INTERVENTIONS AS ORDERED.
 
INTEGUMENTARY:
-GT CINDY-STOMA SKIN DRY AND INTACT
-MOIST ASSOCIATED DERMATITIS TO BUTTOCKS, PERINEUM, GROINS AREA, SKIN INTACT
WITH REDNESS IMPROVING.
-SACRALCOCCYX PRESSURE ULCER STAGE 2, WITH 1.5X1 SUPERFICIAL DEPTH, WOUND BED
 % GRANULATING MOIST, NO ODOR, WOUND EDGE FLAT AND CINDY-WOUND SKIN
INTACT
-BLE DRYNESS WITH SKIN INTACT
-BILATERAL HEELS BLANCHABLE REDNESS RESOLVED
 
RECOMMENDATIONS:
-PLEASE FOLLOW GT SITE CARE PROTOCAL
-CLEANSE WITH SOAP AND WATER, PAT DRY, APPLY HYDRAGUARD TO BUTTOCKS, PERINEUM,
GROINS AREAS BID AND OPEN TO AIR
-CLEANSE SACRALCOCCYX PRESSURE INJURY WITH NS, PAT DRY, APPLY Z GUARD AND
COVER WITH OPTIFOAM QD AND PRN IF SOILING.
-OFFLOAD BILATERAL HEELS BY PLACING PILLOWS UNDER CALVES UNLESS OTHERWISE
CONTRAINDICATED
-PRESSURE REDISTRIBUTION SURFACE THERAPY
-TURN AND REPOSITION Q2H, OFFLOAD RIGHT, LEFT EARS AND SACRALCOCCYX
-KEEP SKIN DRY AND CLEAN AT ALL TIMES
-CONTINUE TO FOLLOW RD RECOMMENDATIONS
 
PLEASE CONTACT WOUND CARE NURSE FOR ANY QUESTION AND CHANGE OF WOUND
CONDITION.

## 2019-08-22 NOTE — NUR
NOTIFIED DR GUERIN AND DR SKINNER ABOUT SWALLOW EVAL RESULTS. PER
RECOMMENDATIONS PATIENT IS ABLE TO TAKE PUREE DIET W/ THIN LIQUIDS. EARLIER,
DAUGHTER ASKED ABOUT PUREE DIET. DR SKINNER WILL CHECK WITH DR MENDOZA. NO
NEW ORDERS AT THIS TIME. WILL CONTINUE TO MONITOR.

## 2019-08-22 NOTE — NUR
Awake and verbally responsive. Slow to respond. Soft low voice. No respiratory
distress noted on 02 1lpm via n/c. Denies pain. Denies n/v. Gtube feeding
tolerated well.  HOB kept elevated. Repositioned for comfort. Will cont.to
monitor. Call light within reach.

## 2019-08-22 NOTE — NUR
RESTED IN LONG INTERVALS THROUGHOUT THE NIGHT. NO ACUTE CHANGES NOTED.
BREATHING EVEN AND UNLAABORE ON 1L NC. NO C/O PAIN NOTED. DIAZ CATH DRAINING
YELLOW URINE BY GRAVITY. RIJ CENTRAL LINE W/ ALL PORTS PATENT AND INTACT. NO
REDNESS OR SWELLING NOTED. DENIES CHEST PAIN. SAFETY MEASURES IN PLACE. ALL
NEEDS AND CONCERNS ADDRESSED. CALL LIGHT IS WITHIN REACH. TUBE FEEDING IN
PLACE TOLERATING WELL. WILL ENDORSE CARE TO DAY SHIFT RN.

## 2019-08-22 NOTE — NUR
DR SKINNER NOTIFIED THAT GLEN- PATIENT'S DAUGHTER IS REQUESTING TO SPEAK
WITH HIM. DR SKINNER PROVIDED WITH GLEN PHONE NUMBER. ATTENDING NURSE KATHRYN SUE.

## 2019-08-22 NOTE — NUR
PATIENT HAD A BM. PATIENT CLEANED AND CHANGED WITH FREDY BRIONES. NO DISTRESS
NOTED. PATIENT MADE COMFORTABLE AND IN BED AND REPOSITIONED FOR COMFORT. 850ML
YELLOW OUTPUT NOTED FROM DIAZ CATH. NO ODOR NOTED. OM 1L NC. BREATHING EVEN
AND UNLABORED. CALL LIGHT IS WITHIN REACH. SAFETY MEASURES IN PLACE. WILL
CONTINUE TO MONITOR.

## 2019-08-22 NOTE — NUR
RESTING IN BED WITH EYES CLOSED. NO DISTRESS NOTED. BREATHING EVEN AND
UNLABORED ON 1L NC. NO S/S OF PAIN NOTED. TUBE FEEDING VIA GTUBE AT 50ML/HR W/
FWF 100ML Q6H. SAFETY MEASURES IN PLACE. CALL LIGHT IS WITHIN REACH. WILL
CONTINUE TO MONITOR.

## 2019-08-22 NOTE — NUR
REPORTED TO DR. SKINNER WITH PATIENT'S POTASSIUM LEVEL 3.4 W/ LASIX IVP. NEW
ORDER OF KCL 40 MEQ GT GIVEN.

## 2019-08-23 VITALS — DIASTOLIC BLOOD PRESSURE: 69 MMHG | SYSTOLIC BLOOD PRESSURE: 118 MMHG

## 2019-08-23 VITALS — SYSTOLIC BLOOD PRESSURE: 143 MMHG | DIASTOLIC BLOOD PRESSURE: 77 MMHG

## 2019-08-23 VITALS — DIASTOLIC BLOOD PRESSURE: 72 MMHG | SYSTOLIC BLOOD PRESSURE: 127 MMHG

## 2019-08-23 VITALS — DIASTOLIC BLOOD PRESSURE: 71 MMHG | SYSTOLIC BLOOD PRESSURE: 125 MMHG

## 2019-08-23 LAB
BASOPHILS NFR BLD: 0.3 % (ref 0–2)
BUN SERPL-MCNC: 17 MG/DL (ref 7–18)
CALCIUM SERPL-MCNC: 8.4 MG/DL (ref 8.5–10.1)
CHLORIDE SERPL-SCNC: 103 MMOL/L (ref 98–107)
CO2 SERPL-SCNC: 38 MMOL/L (ref 21–32)
CREAT SERPL-MCNC: 0.4 MG/DL (ref 0.6–1)
ERYTHROCYTE [DISTWIDTH] IN BLOOD BY AUTOMATED COUNT: 18.4 % (ref 11.5–14.5)
GFR SERPLBLD BASED ON 1.73 SQ M-ARVRAT: (no result) ML/MIN
GLUCOSE SERPL-MCNC: 141 MG/DL (ref 74–106)
PLATELET # BLD: 158 X10^3MCL (ref 130–400)
POTASSIUM SERPL-SCNC: 3.6 MMOL/L (ref 3.5–5.1)
SODIUM SERPL-SCNC: 146 MMOL/L (ref 136–145)

## 2019-08-23 NOTE — NUR
Afebrile. No significant change in condition noted. Denies pain. Gtube feeding
tolerated well. No residual. No n/v.  Had bm this shift. Incontinent. Good
pericare, ortiz catheter care rendered. Turned and repositioned q2h. Kept
pressure off back and bony prominences. Kept skin clean and dry after each
incontinence. Heels floated. On air mattress. In no apparent distress.

## 2019-08-23 NOTE — NUR
PT RESTING WITH NO DISTRESS NOTED. PT DENIES PAIN. CENTRAL LINE DRESSING
CHANGED. PT TOLERATED WELL. GTUBE FLUSHED. NO GASTRIC RESIDUAL NOTED. PT ON 1L
O2 SAT 92% AT THIS TIME. SAFETY MAINTAINED.

## 2019-08-23 NOTE — NUR
DUE MEDS ADMINISTERED VIA GTUBE. PT APICAL PULSE 87, DIGOXIN ADMINISTERD AS
ORDERED. NO GASTRIC RESIDUAL NOTED AT THIS TIME. HOB AT 35 DEGREES. NO ACUTE
DISTRESS OR DISCOMFORT NOTED AT THIS TIME.

## 2019-08-23 NOTE — NUR
RECEIVED PT FROM AM NURSE. PT LAYING DOWN IN BED WITH FAMILY AT BEDSIDE. PT
AAOX2, FOLLOW COMMANDS. MED/SURG. DENIES CP/PRESSURE AT THIS TIME. PALPABLE
PULSES AL THROGHOUT. MILD EDEMA NOTED TO BUE. DIMINISHED BREATH SOUNDS TO BLL.
BREATHING EVEN AND UNLABORED ON 1L NC. O2 SAT AT 98%. NO SOB NOTED. NO ACUTE
DISTRES NOTED. ABD SOFT AND NONDISTED. ACTIVE BS X4 QUAD. LAST BM
8/23/19.G-TUBE TO ABD. SURROUNDING SKIN INTACT.  GTUBE INFUSING VITAL AF 1.2
AT 50ML/HR WITH 100ML FQ6H. NO RESUDUAL NOTED. HOB ELEVATED. DIAZ CATH IN
PLACE DRAINING CLEAR YELLOW URINE. GENERALIZED WEAKNESS. TOTAL CARE. WILL
REPOSITION Q2H. WOUND TO SACRAL AREA COVERED WITH OPTIFOAM. OPTIFOAM CDI. RIJ
SALINE LOCK FLUSHING WELL. SITE FREE FROM REDNESS AND SWELLING. BED AT LOWEST
SETTING. SIDE RAIL X2 UP. CALL LIGHT WITHING REACH. WILL CONTINUE TO MONITOR.

## 2019-08-23 NOTE — NUR
PT WITH BOWEL MOVEMENT. PT CLEANED AND REPOSITIONED WITH CNA, ZGUARD APPLIED
TO SACRAL WOUND, PERIANAL AREA. NEW OPTIFOAM APPLIED. WILL CONTINUE TO MONITOR
AND ENDORSE CARE TO NIGHT SHIFT.

## 2019-08-23 NOTE — NUR
RECEIVED PT FROM NIGHT SHIFT. PT AWAKE, ALERT. A/OX2. PT ON 1L NC WITH NO RESP
DISTRESS NOTED AT THIS TIME. IV ACCESS RIJ CDI SALINE LOCKED. PERIPHERAL
PULSES PALPABLE, NO EDEMA NOTED. PT WITH GENERALIZED WEAKNESS, PT TOTAL CARE.
PT HAS GTUBE WITH VITAL AF 1.2 AT 50ML/HR. NO GASTRIC RESIDUAL NOTED. HOB AT
35 DEGREES. SAFETY MEASURES IN PLACE, BED LOW AND LOCKED. CALL LIGHT WITHIN
REACH.

## 2019-08-23 NOTE — NUR
**********PHYSICAL THERAPY DAILY NOTES CO-SIGN**********
All documentation done by the Physical Therapy Assistant for 08/22/19
has been reviewed. I agree with the documentation.
 
Reviewed/Co-Signed by: Lashanda Arechiga  PT
Documentation Done by:BRAYDEN MARTINEZ PTA

## 2019-08-23 NOTE — NUR
**********PHYSICAL THERAPY DAILY NOTES CO-SIGN**********
All documentation done by the Physical Therapy Assistant for 08/23/19
has been reviewed. I agree with the documentation.
 
Reviewed/Co-Signed by: Lashanda Arechiga  PT
Documentation Done by: BRAYDEN MARTINEZ PTA

## 2019-08-23 NOTE — NUR
PT RESTING WITH NO ACUTE DISTRESS NOTED. PT FED SMALL AMOUNT OF PUREE DIET FOR
ORAL GRATIFICATION. PT NOTED TO HAVE DELAYED SWALLOWING. PT GTUBE FLUSHED WITH
NO GASTRIC RESIDUAL NOTED. HOB AT 35 DEGREES. SAFETY MAINTAINED.

## 2019-08-23 NOTE — NUR
PT STABLE AT THIS TIME. ALL NEEDS MET THROUGHOUT SHIFT. WILL CONTINUE TO
MONITOR AND ENDORSE CARE TO NIGHT SHIFT.

## 2019-08-24 VITALS — SYSTOLIC BLOOD PRESSURE: 138 MMHG | DIASTOLIC BLOOD PRESSURE: 87 MMHG

## 2019-08-24 VITALS — DIASTOLIC BLOOD PRESSURE: 87 MMHG | SYSTOLIC BLOOD PRESSURE: 138 MMHG

## 2019-08-24 VITALS — DIASTOLIC BLOOD PRESSURE: 90 MMHG | SYSTOLIC BLOOD PRESSURE: 149 MMHG

## 2019-08-24 VITALS — DIASTOLIC BLOOD PRESSURE: 86 MMHG | SYSTOLIC BLOOD PRESSURE: 129 MMHG

## 2019-08-24 VITALS — DIASTOLIC BLOOD PRESSURE: 78 MMHG | SYSTOLIC BLOOD PRESSURE: 131 MMHG

## 2019-08-24 VITALS — SYSTOLIC BLOOD PRESSURE: 139 MMHG | DIASTOLIC BLOOD PRESSURE: 86 MMHG

## 2019-08-24 LAB
BASOPHILS NFR BLD: 0.4 % (ref 0–2)
BUN SERPL-MCNC: 19 MG/DL (ref 7–18)
CALCIUM SERPL-MCNC: 8.5 MG/DL (ref 8.5–10.1)
CHLORIDE SERPL-SCNC: 103 MMOL/L (ref 98–107)
CO2 SERPL-SCNC: 39 MMOL/L (ref 21–32)
CREAT SERPL-MCNC: 0.5 MG/DL (ref 0.6–1)
ERYTHROCYTE [DISTWIDTH] IN BLOOD BY AUTOMATED COUNT: 18.3 % (ref 11.5–14.5)
GFR SERPLBLD BASED ON 1.73 SQ M-ARVRAT: (no result) ML/MIN
GLUCOSE SERPL-MCNC: 137 MG/DL (ref 74–106)
PLATELET # BLD: 159 X10^3MCL (ref 130–400)
POTASSIUM SERPL-SCNC: 3.4 MMOL/L (ref 3.5–5.1)
SODIUM SERPL-SCNC: 148 MMOL/L (ref 136–145)

## 2019-08-24 NOTE — NUR
RESIDUAL RECHECKED: 0 ML. FAMILY AT BEDSIDE REQUESTING TO SPEAK TO DR SKINNER.
DR SKINNER NOTIFIED RESIDUAL 0 ML AND THAT FAMILY REQUESTING TO SPEAK TO HIM.
DR SKINNER ALSO GIVEN COPY OF DIETICIAN RECOMMENDATION. DR SKINNER ALSO
NOTIFIED THAT PT STILL HAS PUREE DIET ORDERED. PT HAD LARGE LOOSE BM, LINENS
AND GOWN CHANGED, DIAZ CARE DONE, CHG WIPES DONE. PT REPOSITIONED FROM R SIDE
TO L SIDE. CALL LIGHT WITHIN REACH.

## 2019-08-24 NOTE — NUR
TUBE FEEDING INITIATED: VITAL AF 1.2 @ 10 ML/HR, H2O FLUSH 100 ML Q 6HRS, TO
BE ADVANCED Q 4HRS. PT REPOSITIONED TO SUPINE, NO RESPRIATORY DISTRESS NOTED
ON 1L NC, CALL LIGHT WITHIN REACH.

## 2019-08-24 NOTE — NUR
PT LAYIND DOWN IN BED WITH EYES CLOSED. BREATHING EVEN AND UNLABORED ON 1L NC
. NO ACUTE DISTRESS NOTED. BED AT LOWEST SETTING. SIDE RAILS X2 UP. CALL LIGHT
WITHING REACH. WILL CONTINUE TO MONITOR.

## 2019-08-24 NOTE — NUR
CALLED AND SPOKE TO (RESIDENT)ASSIGNED TO THIS PT & UPDATED HIM OF
PT CURRENT RESIDUAL AFTER 1 AND A HALF HOUR THAT FEEDING WAS HELD WHICH
NATALIA OSULLIVAN HAD REPORTED IT REMAINS 450ML.
 SAYS HE WILL ORDER MEDICATION FOR PREMOTILITY AND WILL DISCONTINUE
THE PUREE DIET AND JUST KEEP PT ON TUBE FEEDING. FOR NOW FEEDING WILL REMAIN
ON HOLD D/T INCREASE RESIDUAL AS ORDERED. WILL AWAIT FOR 'S ORDER.
NATALIA OSULLIVAN ASSIGNED TO THIS PT MADE AWARE OF ABOVE.

## 2019-08-24 NOTE — NUR
AFTER 1.5 HRS, 450 ML RESIDUAL REMAINING AND REPLACED. CHARGE NURSE NOTIFIED
AND WILL CALL DR SKINNER.

## 2019-08-24 NOTE — NUR
PT SEEN, RESTING IN BED, AWAKE, ALERT AND ORIENTED TO SELF AND FAMILY MEMBERS,
SLOW TO ANSWER QUESTIONS, BREATHING EVEN AND UNLABORED, LUNG SOUNDS
DIMINISHED, ON O2 1L VIA NC, NO RESP DISTRESS NOTED, RT PROTOCOL, MEDSURG PT,
NO S&S OF CHEST PAIN, PULSES PALPABLE, EDEMA NOTED TO BLE AND BUE, GENERALIZED
WEAKNESS, TOTAL CARE, ON AIR MATTRESS, ABD SOFT AND FLAT WITH ACTIVE BS, NO
BM AT THIS TIME, ON TF-VITAL AF 1.2 @ 10 ML/HR,  ML Q6GRS, NO RESIDUAL
AT THIS TIME, HOB AND ASP PRECAUTION IN PLACE, DIAZ VIA GRAVITY DRAINING
YELLOW URINE, SACRAL/COCCYX WITH PRESSURE INJURY, Z-GUARD AND OPTIFORM IN
PLACE, CENTRAL LINE TO RIJ, DRESSING C/D/I, NO DISTRESS NOTED, WILL KEEP TO
MONITOR.

## 2019-08-24 NOTE — NUR
A+OX2, NO RESPRIATORY DISTRESS NOTED, MEDSURG, PULSES MODERATE AND EQUAL EARL,
EDEMA BUE, LUNG SOUNDS DIMINISHED, 1L NC, BOWEL SOUNDS ACTIVE, G TUBE WITH
VITAL AF @ 50 ML/HR, DIAZ CATH DRAINING YELLOW URINE, GENERALIZED WEAKNESS,
SACRAL WOUND WITH OPTIFOAM, Z GUARD TO PERIANAL AREA, CENTRAL LINE IN RIJ
SALINE LOCKED, SITE WNL.

## 2019-08-24 NOTE — NUR
PT SITTING AT 90 DEGREES DRINKING ENSURE, NO RESPRIATORY DISTRESS NOTED, IN NO
APPARANT PAIN. RESIDUAL 450 ML, TUBE FEEDING HELD AT THIS TIME. WILL NOTIFY
RESIDENT DURING ROUNDS. CALL LIGHT WITHIN REACH.

## 2019-08-24 NOTE — NUR
PT SLEPT WELL THROGHOUT THE NIGHT. PT ON 1L NC O2 SAT AT 98% NO SOB NOTED. NO
RESP DISTRES NOTED.  GTUBE FEEDING REMAINS AT 50ML/HR 100ML FQ6 WITH VITAL AF
1.2. 20CC. RESIDUAL NOTED.  DIAZ CATH RENDERED. PT TOLERATED WELL. NO ACUTE
DISTRESS NOTED. RIJ PATENT AND INTACT. BED AT LOWEST SETTING. SIDE RAILS X2
UP. CALL LIGHT WITHING REACH. WILL ENDORSE CARE TO AM NURSE.

## 2019-08-24 NOTE — NUR
Follow-up Nutrition Assessment: Lori Schmitz [id number]
 
Dx:ALOC, Respiratory failure
Labs:() Na:148H, K:3.4L, BH, BUN:19H, Cr:0.5L, H/H:9.5/30L
Meds: Humulin, KCL, Lanoxin, Lasix, Lipitor, Pulmicort, Orilosec, Tylenol,
Vancomycin and Zofran
Current Nutrition Support: Vital AF 1.2 at 50ml/hr, FWF:100ml q 6hr via G-tube
and pureed diet for oral gratification.
TF intake: ()1150ml ()1100ml
I/O: () 1960/1850(+110ml) () 185/(-150ml)
Residuals: ()0ml ()450ml x2 TF held
Weights: ()63.7kg
Skin: Per wound RN note , pt with sacralcoccyx pressure ulcer stage 2.
Edema: BUE
Last BM: 
Per progress note , last day of antibiotics was today. The daughter is
also checking some nursing homes to decide which one she transfer her mom to.
Patient will be discharged on . Per charge ABRAN Campos, pt with 450ml
GRV x 2. Recommended to add promotility agent (Reglan q 8hrs), d/c oral
gratification and restart TF at 10ml and increase 10ml q 4hr to to goal.
 
 
Estimated Nutritional Needs based on actual body weight: 66kg
Energy: 1650-1980kcal.day (25-30kcal/kg for wound healing)
Protein: 80-99g/day  (1.2-1.5g/kg for wound healing)
Fluid:per doctor
 
Nutrition Diagnosis
1. Increased nutrient needs related to altered skin integrity as evidenced by
pt with stage 2 sacralcoccyx pressure ulcer.
Intervention
1. Recommend prokinetic Reglan q 8hrs for GRV 450ml x2.
2. Recommend restart TF at 10 ml/hr and increase 10ml q 4hr to goal of to
60ml/hr to better meet pt's estimated needs for wound healing. This provides
1728kcal, 108g protein. This meets 100% caloric needs and 109% protein needs.
3. Recommend adding Theragran, Vitamin C 500mg and Zinc sulfate 220mg for
wound healing.
Monitor/Evaluate
Previous goal: TF intake at least 75% of estimated needs (not met)
Goal: TF intake at least 75% of estimated needs and wound healing
Monitor: TF intake/tolerance,  Labs, GI function, weights and skin integrity
 F/U in 2-3  days as high risk:-

## 2019-08-24 NOTE — NUR
PER DR SKINNER, HOLD INSULIN FOR . PT RESTING IN BED, NO RESPRIATORY
DISTRESS NOTED, CONT TO BE ON L SIDE TO ASSIST WITH DIGESTION, CALL LIGHT
WITHIN REACH.

## 2019-08-24 NOTE — NUR
1. Recommend prokinetic Reglan q 8hrs for GRV 450ml x2.
2. Recommend restart TF at 10 ml/hr and increase 10ml q 4hr to goal of to
60ml/hr to better meet pt's estimated needs for wound healing. This provides
1728kcal, 108g protein. This meets 100% caloric needs and 109% protein needs.
3. Recommend adding Theragran, Vitamin C 500mg and Zinc sulfate 220mg for
Stage 2 sacralcoccyx pressure ulcer.

## 2019-08-25 VITALS — SYSTOLIC BLOOD PRESSURE: 106 MMHG | DIASTOLIC BLOOD PRESSURE: 78 MMHG

## 2019-08-25 VITALS — DIASTOLIC BLOOD PRESSURE: 79 MMHG | SYSTOLIC BLOOD PRESSURE: 121 MMHG

## 2019-08-25 VITALS — DIASTOLIC BLOOD PRESSURE: 65 MMHG | SYSTOLIC BLOOD PRESSURE: 111 MMHG

## 2019-08-25 VITALS — SYSTOLIC BLOOD PRESSURE: 121 MMHG | DIASTOLIC BLOOD PRESSURE: 64 MMHG

## 2019-08-25 LAB
BASOPHILS NFR BLD: 0.4 % (ref 0–2)
BUN SERPL-MCNC: 20 MG/DL (ref 7–18)
CALCIUM SERPL-MCNC: 8.7 MG/DL (ref 8.5–10.1)
CHLORIDE SERPL-SCNC: 102 MMOL/L (ref 98–107)
CO2 SERPL-SCNC: 41.5 MMOL/L (ref 21–32)
CREAT SERPL-MCNC: 0.4 MG/DL (ref 0.6–1)
ERYTHROCYTE [DISTWIDTH] IN BLOOD BY AUTOMATED COUNT: 18.4 % (ref 11.5–14.5)
GFR SERPLBLD BASED ON 1.73 SQ M-ARVRAT: (no result) ML/MIN
GLUCOSE SERPL-MCNC: 104 MG/DL (ref 74–106)
PLATELET # BLD: 152 X10^3MCL (ref 130–400)
POTASSIUM SERPL-SCNC: 3.4 MMOL/L (ref 3.5–5.1)
SODIUM SERPL-SCNC: 149 MMOL/L (ref 136–145)

## 2019-08-25 NOTE — NUR
RECEIVED PATIENT FROM ABRAN VIEIRA. PATIENT RESTING IN BED, NO COMPLAINTS OF PAIN AT
THIS TIME. NOTIFIED DR CAPUTO SAYED THAT PATIENT CO2 ELEVATED TO 41.5, STATES WE
WILL WAIT FOR PULMONOLOGY TO EVALUATE PATIENT. ALSO MADE AWARE THAT PATIENT
PO KLOR-CON CANNOT BE CRUSHED FOR GT ADMIN, STATES HE WILL SWITCH TO LIQUID
FORM. WILL CONTINUE TO MONITOR. CALL LIGHT IN REACH AT THIS TIME.

## 2019-08-25 NOTE — NUR
RECHECK PT'S TUBE FEEDING, NO RESIDUAL AT THIS TIME, INCREASED TO 20 ML/HR,
WILL CONTINUE TO MINITOR.

## 2019-08-25 NOTE — NUR
PATIENT IN BED, NO S/S OF DISTRESS. PEG TUBE RESIDUAL CHECKED, ~15ML AND
REPLACED. PEG TUBE MEDICATIONS ADMINISTERED, TOLERATED. TUBE FEEDING INCREASED
BY 10 ML/HR NOW AT 40ML/HR. CALL LIGHT IN REACH AT THIS TIME, BED IN LOWEST
POSITION.

## 2019-08-25 NOTE — NUR
PT RECEIVED A/O X2, SLOW RESPONSES NOTED, PT ABLE TO ANSWER YES OR NO
QUESTIONS. MED-SURG, DENIES ANY CP/RPESSURE. PULSES PALPABLE, EDEMA TO BUE AND
BLE. LUNG SOUNDS DIM EARL, BREATHING IS EVEN AND UNLABORED ON 1L NC, NO RESP
DISTRESS NOTED. ABD SOFT AND ROUND, BOWEL TONES ACTIVE X4 QUAD, NO N/V
PRESENT. VITAL AF 1.2 INFUSING WELL AT 50 ML/HR WITH 200 ML FWF Q6H,
RESIDULA-10 ML, REPLACED, HOB ELEVATED. DIAZ CATH SECURE AND IN PLACE
DRAINING VIA GRAVITY, YELLOW URINE NOTED. GENERALIZED WEAKNESS, MAX ASSIST, ON
AIR MATTRESS; PT IS TOTAL CARE. SMALL OPEN WOUND NOTED TO SACRUM/COCCYX WITH
ZGUARD AND OPTIFOAM IN PLACE. PT DENIES HAVING ANY PAIN AT THIS TIME. RIJ
CENTRAL LINE, TRIPLE LUMEN IN PLACE, PORTS PATENT, SITE FREE FROM REDNESS OR
SWELLING. NO ACUTE DISTRESS NOTED. BED ALARM ON. CALL LIGHT WITHIN REACH. WILL
CONT TO MONITOR.

## 2019-08-25 NOTE — NUR
DR CAPUTO SAYED IN TO SPEAK WITH PATIENT DAUGHTERS.  DISCUSS PLAN OF CARE.
DAUGHTERS STATE THEY WERE LOOKING FOR LONG TERM CARE FACILITY AND THAT THEY
WILL SPEAK WITH  ERVIN TOMORROW. ALL CONCERNS INCLUDING MEDICATION
REGIMENT AND FUTURE CARE DISCUSSED WITH DR CAPUTO SAYED.  PATIENT IN BED AT THIS
TIME, NO S/S OF PAIN OR DISTRESS. WILL ENDORSE TO ONCOMING NURSE.  FAMILY AT
BEDSIDE.  CALL LIGHT IN REACH.

## 2019-08-25 NOTE — NUR
PT ASLEEP BUT EASILY AROUSABLE, SLEPT MOST OF NIGHT, BREATHING EVEN AND
UNLABORED ON O2 1L VIA NC WITH NO RESP DISTRESS OR SOB NOTED, MORNING BLOOD
SUGAR-97 MG/DL WITH NO RISS, TF WITH VITAL AF 1.2 @ 30 ML/HR, NO RESIDUAL
NOTED, HOB AND ASP PRECAUTION IN PLACE, DIAZ CARE GIVEN, NO DISTRESS NOTED,
WILL KEEP TO MONITOR.

## 2019-08-26 VITALS — SYSTOLIC BLOOD PRESSURE: 131 MMHG | DIASTOLIC BLOOD PRESSURE: 73 MMHG

## 2019-08-26 VITALS — SYSTOLIC BLOOD PRESSURE: 119 MMHG | DIASTOLIC BLOOD PRESSURE: 79 MMHG

## 2019-08-26 VITALS — SYSTOLIC BLOOD PRESSURE: 115 MMHG | DIASTOLIC BLOOD PRESSURE: 64 MMHG

## 2019-08-26 VITALS — SYSTOLIC BLOOD PRESSURE: 118 MMHG | DIASTOLIC BLOOD PRESSURE: 68 MMHG

## 2019-08-26 LAB
BASOPHILS NFR BLD: 0.3 % (ref 0–2)
BUN SERPL-MCNC: 20 MG/DL (ref 7–18)
CALCIUM SERPL-MCNC: 8.6 MG/DL (ref 8.5–10.1)
CHLORIDE SERPL-SCNC: 106 MMOL/L (ref 98–107)
CO2 SERPL-SCNC: 41.4 MMOL/L (ref 21–32)
CREAT SERPL-MCNC: 0.5 MG/DL (ref 0.6–1)
ERYTHROCYTE [DISTWIDTH] IN BLOOD BY AUTOMATED COUNT: 18.3 % (ref 11.5–14.5)
GFR SERPLBLD BASED ON 1.73 SQ M-ARVRAT: (no result) ML/MIN
GLUCOSE SERPL-MCNC: 129 MG/DL (ref 74–106)
MAGNESIUM SERPL-MCNC: 2 MG/DL (ref 1.8–2.4)
PHOSPHATE SERPL-MCNC: 3.6 MG/DL (ref 2.5–4.9)
PLATELET # BLD: 140 X10^3MCL (ref 130–400)
POTASSIUM SERPL-SCNC: 3.5 MMOL/L (ref 3.5–5.1)
SODIUM SERPL-SCNC: 150 MMOL/L (ref 136–145)

## 2019-08-26 NOTE — NUR
RECEIVE DPATIENT IN BED AWAKE AND ALERT WITH NO SIGN OF DISTRESS, BREATHING
EASY AND NONLABOR ON O2 AT 1L VIA NC. BLE AND LE EDEMA NOTED, PATIEN ERENDIRA JIMENEZ PO. ABDOMEN SOFT AND NONTENDER WITH GTUBE FEEDING TF VITAL AT 50ML/HR
WITH FWF OF 150ML Q 2HRS. NO RESIDUAL NOTED. DRESSING WITH Z GUARD AND
OPTIFOAM TO SACRAL AREA. IV HEPLOCK FLUSHED WITH NS. DIAZ TO GRAVITY DRAINING
YELLOW URINE OUTPUT. WILL CONTINUE TO MONITOR. DAUGHTER AT BEDSIDE.

## 2019-08-26 NOTE — NUR
**********PHYSICAL THERAPY DAILY NOTES CO-SIGN**********
All documentation done by the Physical Therapy Assistant for 08/26/19
has been reviewed. I agree with the documentation.
 
Reviewed/Co-Signed by: Lashanda Arechiga  PT
Documentation Done by: BRAYDEN MARTINEZ PTA

## 2019-08-26 NOTE — NUR
PT SLEPT WELL THROUGHOUT THE EVENING. BREATHING IS EVEN AND UNLABORED, NO RESP
DISTRESS NOTED. PT DENIES HAVING ANY PAIN AT THIS TIME. PT CLEANED AND
REPOSITIONED, Z-GUARD AND OPTIFOAM PLACED TO SACRUM. DIAZ CARE AND CHG WIPES
GIVEN. GTUBE INFUSING WELL @ 50 ML/HR WITH 200 ML FWF Q6H, RESIDUAL-10 ML,
REPLACED. HOB ELEVATED, ASPIRATION PRECAUTIONS IN PLACE. NO ACUTE CHANGES
ENCOUNTERED DURING SHIFT. ALL NEEDS MET AND ANTICIPATED. CENTRAL LINE IN
PLACE, CDI, SITE WNL. BED ALARM ON, CALL LIGHT WITHIN REACH. CONTINUITY OF
CARE ENDORSED TO MELVA OSULLIVAN. ALL QUESTIONS AND CONCERNS ADDRESSED.

## 2019-08-26 NOTE — NUR
RECEIVED PT IN BED. ASSESSED AND DOCUMENTED. STABLE. DENIES ANY PAIN.
SAFTEY PRECAUTIONS ARE IN PLACE. WILL MONITOR.

## 2019-08-26 NOTE — NUR
PT RESTING IN BED WITH EYES CLOSED, BUT IS EASILY AROUSABLE. BREATHING IS EVEN
AND UNLABORED ON 1L NC, NO RESP DISTRESS NOTED. PT DENIES HAVING ANY PAIN AT
THIS TIME. TUBE FEEDING RESIDUAL CHECKED-40 ML, REPLACED. HOB ELEVATED,
ASPIRATION PRECATIONS IN PLACE. NO ACUTE DISTRESS NOTED. CALL LIGHT WITHIN
REACH. WILL CONT TO MONITOR.

## 2019-08-26 NOTE — NUR
PT RESTING IN BED COMFORTABLY. REPOSITIONED Q2HR. DENIES PAIN THIS TIME.
CHANGED FREE WATER FLUSH TO 150ML Q2HR AS PER ORDER. STABLE. GAVE REPORT TO
NIGHT SHIFT NURSE.

## 2019-08-27 VITALS — SYSTOLIC BLOOD PRESSURE: 138 MMHG | DIASTOLIC BLOOD PRESSURE: 88 MMHG

## 2019-08-27 VITALS — SYSTOLIC BLOOD PRESSURE: 127 MMHG | DIASTOLIC BLOOD PRESSURE: 82 MMHG

## 2019-08-27 VITALS — DIASTOLIC BLOOD PRESSURE: 75 MMHG | SYSTOLIC BLOOD PRESSURE: 132 MMHG

## 2019-08-27 VITALS — SYSTOLIC BLOOD PRESSURE: 131 MMHG | DIASTOLIC BLOOD PRESSURE: 73 MMHG

## 2019-08-27 LAB
BUN SERPL-MCNC: 21 MG/DL (ref 7–18)
CALCIUM SERPL-MCNC: 8.5 MG/DL (ref 8.5–10.1)
CHLORIDE SERPL-SCNC: 102 MMOL/L (ref 98–107)
CO2 SERPL-SCNC: 39.9 MMOL/L (ref 21–32)
CREAT SERPL-MCNC: 0.5 MG/DL (ref 0.6–1)
GFR SERPLBLD BASED ON 1.73 SQ M-ARVRAT: (no result) ML/MIN
GLUCOSE SERPL-MCNC: 146 MG/DL (ref 74–106)
POTASSIUM SERPL-SCNC: 3.5 MMOL/L (ref 3.5–5.1)
SODIUM SERPL-SCNC: 144 MMOL/L (ref 136–145)

## 2019-08-27 NOTE — NUR
RECEIVED PATIENT IN BED SITTING WITH NO SIGN OF ACUTE DISTRESS. BREATHING EASY
AND NONLABOR SATTING AT 99% RA. PATIENT AWAITING  FOR TRANSFER. WILL
CONTINUE TO MONITOR.

## 2019-08-27 NOTE — NUR
PT RESTING IN BED COMFORTABLY. DENIES ANY PAIN. STABLE. REPOSITIONING Q2HR.
SAFTEY PRECAUTIONS ARE IN PLACE. WILL MONITOR.

## 2019-08-27 NOTE — NUR
TUBE FEEDING CHANGED INFUSING WELL AT 50ML/HR WITH FWF  ML Q6 HRS. NO
RESIDUAL NOTED. REPOSITIONED FOR COMFORT. WILL CONTINUE TO MONITOR.

## 2019-08-27 NOTE — NUR
1. Recommend continuing Vital AF 1.2 to goal of 50ml/hr, frequency of
advancement 10 ml Q4H. FWF per MD (100cc Q6H). This will provide 1440 kcal and
90g protein. This will meet >75% of estimated calorie and protein needs of the
patient.
2. Recommend continuing Theragran, Vitamin C 500 mg and Zinc sulfate 220 mg
for wound healing.

## 2019-08-27 NOTE — NUR
CHECKED AT INTERVALS FOR NEEDS AND SAFETY. HAD BM X1 SOFT IN MODERATE AMOUNT.
REPOSITIONED FOR COMFORT.

## 2019-08-27 NOTE — NUR
PT RESTING IN BED COMFORTABLY. STABLE. DAUGHTER JUST CAME TO VISIT THE PT.
[facility] NEED TO ARRANGE BIPAP BEFORE PT TRANSFER THERE AS PER ORDER.
REPORT GIVEN TO NIGHT SHIFT NURSE.

## 2019-08-27 NOTE — NUR
RECEIVED PATIENT IN BED AWAKE AND ALERT WITH NO SIGN OF ACUTE DISTRESS.
BREATHING EASY AND NONLABORON O2 AT 1L VIA NC.  ABDOMEN SOFT AND NONTENDER
WITH ACTIVE BS, WITH GTUBE FEEDINGS TF VITAL AT 50ML/HR WITH FWF OF 250ML Q
6HRS. DIAZ TO GRAVITY DRAINING YELLOW URINE OUTPUT. CENTRAL LINE TO RIJ CDI
AND HEPLOCK. WILL CONTINUE TO MONITOR. DAUGHTER AT BEDSIDE.

## 2019-08-27 NOTE — NUR
Follow-up Nutrition Assessment: 221/B [place] [facility] FU HR
 
 Dx: ALOC, respiratory failure
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
HTN, DM, aortic stenosis, end stage heart failure, cardiomegaly, A. fib,
tricuspid insufficiency, and anemia
 Labs: (8/27): BG 146H, BUN 21H, CREAT 0.5L, HGB 9.0L
Meds: D 50%, humulin, Lipitor, Pulmicort, reglan, Theragran, vitamin C, Zinc
sulfate, vancomycin, zofran
 Diet: TF Vital AF 1.2 @ 10 ml/hr, goal 50 ml/hr,  cc Q2H
 PO Intake: NPO
 Weights in kg: (8/18) 72, (8/19) 74, (8/20) 63.7, (8/27) - fluctuation d/t
edema
 Skin: small open wound noted to coccyx/sacral area, Z- guard w/ optifoam in
place
Keny: 12
 I/Os: (8/26) 1550/3000 (-1450)
 Edema: BLE, BUE
 GI: Last BM: 8/24
 
RDN Visit (8/27): Per progress note (8/26), Pt continues to have hyponatremia,
Lasix D/C'ed, FWF increased to 150cc Q2H. Patient was alert and oriented but
appeared weak. Patient is receiving Vital AF 1.2 @ 50 ml/hr,  cc Q6H
via PEG tube. However TF was help to check residuals. Pt. had 10 cc residuals
per RN Leanne. Per RN, Pt does not have any N/V/D/C but did have loose stools
yesterday.
 
Estimated Nutritional Needs Based on body actual weight 66.6 kg
 Energy: 4205-4913 kcal/d (25-30kcal/kg)
 Protein: 80-86 g/d (1.2-1.3g/kg)- geriatric maintenance, pressure ulcer
 Fluid:  per doctor as pt has complex cardiac problems
 
Nutrition Diagnosis
1. Inadequate enteral nutrition infusion related to low TF rate as evidenced
by current tube feeding rate 35ml/hr meeting <75% of estimated calorie and
protein needs. (improved- rate increased to 50 ml/hr).
2. Increased nutrient needs related to altered skin integrity as evidenced by
stage 2 sacrococcyx pressure ulcer. (healing- per ABRAN Perdomo)
 
Intervention
1. Recommend continuing Vital AF 1.2 to goal of 50ml/hr, frequency of
advancement 10 ml Q4H. FWF per MD (100cc Q6H). This will provide 1440 kcal and
90g protein. This will meet >75% of estimated calorie and protein needs of the
patient.
2. Recommend continuing Theragran, Vitamin C 500 mg and Zinc sulfate 220 mg
for wound healing.
 
Monitor/Evaluate
 Goal: Have pt meet at least 75% of estimated needs
 Monitor: TF intake/ tolerance, Labs, GI function
 F/U in 3-5 days as moderate risk 8/30-9/1

## 2019-08-28 VITALS — DIASTOLIC BLOOD PRESSURE: 65 MMHG | SYSTOLIC BLOOD PRESSURE: 116 MMHG

## 2019-08-28 VITALS — SYSTOLIC BLOOD PRESSURE: 147 MMHG | DIASTOLIC BLOOD PRESSURE: 85 MMHG

## 2019-08-28 VITALS — SYSTOLIC BLOOD PRESSURE: 116 MMHG | DIASTOLIC BLOOD PRESSURE: 63 MMHG

## 2019-08-28 NOTE — NUR
CHECKED AT INTERVALS FOR NEEDS AND SAFETY. REPOSITIONED FOR COMFORT. HAD BM X1
SOFT IN MODERATE AMOUNT. ALL NEEDS ATTENDED.

## 2019-08-28 NOTE — NUR
**********PHYSICAL THERAPY DAILY NOTES CO-SIGN**********
All documentation done by the Physical Therapy Assistant for 08/28/19
has been reviewed. I agree with the documentation.
 
Reviewed/Co-Signed by: Lashanda Arechiga  PT
Documentation Done by: BRAYDEN MARTINEZ PTA

## 2019-08-28 NOTE — NUR
PT RESTING IN BED, NO RESPRIATORY DISTRESS NOTED, IN NO APPARANT PAIN,
REPOSITIONED TO L SIDE, CALL LIGHT WITHIN REACH.

## 2019-08-28 NOTE — NUR
PT PLACED BACK IN BED AND REPOSITIONED TO R SIDE BY PHYSICAL THERAPY, 20 ML
RESIDUAL REPLACED, TOLERATING FEEDING WELL, NO RESPRIATORY DISTRESS NOTED,
CALL LIGHT WITHIN REACH.

## 2019-08-28 NOTE — NUR
PHYSICAL THERAPY SAT PT IN CHAIR, MAX ASSIST, NO RESPIRATORY DISTRESS NOTED,
CALL LIGHT WITHIN REACH.

## 2019-08-28 NOTE — NUR
PT RESTING IN BED WITH BOTH EYES CLOSED, APPEARS TO BE SLEEPING, NO
RESPIRATORY DISTRESS NOTED, IN NO APPARANT PAIN, CALL LIGHT WITHIN REACH.

## 2019-08-28 NOTE — NUR
DIAZ CATH REMOVED WITH CATHETER INTACT, DIAZ EMPTIED: 1400 ML, RIJ CENTRAL
LINE REMOVED WITH CATHETER INTACT, ALL SUTURES REMOVED, PRESSURE PLACED ON
SITE FOR 5 MINS, GAUZE AND TAPE PLACED ON SITE. PT PLACED IN TRANSFER GOWN AND
TRANSFER BLANKET.

## 2019-08-28 NOTE — NUR
A+OX2, NO RESPRIATORY DISTRESS NOTED, MEDSURG, PULSES MODERATEA AND EQUAL EARL,
BUE AND BLE EDEMA, LUNG SOUNDS DIMINISHED, 1L NC, BOWEL SOUNDS ACTIVE, DIAZ
CATH DRAINING YELLOW URINE TO GRAVITY, GENERALIZED WEAKNESS, BEDBOUND, AIR
MATTRESS ON, WOUND TO COCCYX/SACRUM WITH OPTIFOAM CDI, RIJ CENTRAL LINE CDI.

## 2019-08-28 NOTE — NUR
PT RESTING IN BED, NO RESPIRATORY DISTRESS NOTED, IN NO APPARANT PAIN,
REPOSITIONED TO SUPINE, CALL LIGHT WITHIN REACH.

## 2019-08-28 NOTE — NUR
I TELEPHONED DAUGHTER GLEN NORTH AND INFORMED HER THAT PT WOULD BE
TRANSFERRED TO [facility] BETWEEN [phone]. FRANCIA CARROLL STATED CASE MGMT
NOTIFIED HER EARLIER AND IS AGREEABLE TO TRANSFER.

## 2019-08-28 NOTE — NUR
**********PHYSICAL THERAPY DAILY NOTES CO-SIGN**********
All documentation done by the Physical Therapy Assistant for 08/27/19
has been reviewed. I agree with the documentation.
 
Reviewed/Co-Signed by: Lashanda Arechiga  PT
Documentation Done by:BRAYDEN MARTINEZ PTA

## 2019-08-31 ENCOUNTER — HOSPITAL ENCOUNTER (INPATIENT)
Dept: HOSPITAL 1 - ED | Age: 79
LOS: 5 days | Discharge: ANOTHER HOSPITAL | DRG: 207 | End: 2019-09-05
Attending: HOSPITALIST | Admitting: HOSPITALIST
Payer: COMMERCIAL

## 2019-08-31 VITALS
BODY MASS INDEX: 23.74 KG/M2 | BODY MASS INDEX: 23.74 KG/M2 | HEIGHT: 62 IN | WEIGHT: 129 LBS | WEIGHT: 129 LBS | HEIGHT: 62 IN

## 2019-08-31 DIAGNOSIS — I25.10: ICD-10-CM

## 2019-08-31 DIAGNOSIS — K92.0: ICD-10-CM

## 2019-08-31 DIAGNOSIS — I50.32: ICD-10-CM

## 2019-08-31 DIAGNOSIS — I48.2: ICD-10-CM

## 2019-08-31 DIAGNOSIS — J18.9: ICD-10-CM

## 2019-08-31 DIAGNOSIS — Z93.1: ICD-10-CM

## 2019-08-31 DIAGNOSIS — Z79.01: ICD-10-CM

## 2019-08-31 DIAGNOSIS — M81.0: ICD-10-CM

## 2019-08-31 DIAGNOSIS — J96.01: Primary | ICD-10-CM

## 2019-08-31 DIAGNOSIS — D64.9: ICD-10-CM

## 2019-08-31 DIAGNOSIS — Z79.84: ICD-10-CM

## 2019-08-31 DIAGNOSIS — I35.0: ICD-10-CM

## 2019-08-31 DIAGNOSIS — N39.0: ICD-10-CM

## 2019-08-31 DIAGNOSIS — E43: ICD-10-CM

## 2019-08-31 DIAGNOSIS — I27.20: ICD-10-CM

## 2019-08-31 DIAGNOSIS — E11.9: ICD-10-CM

## 2019-08-31 DIAGNOSIS — F03.90: ICD-10-CM

## 2019-08-31 DIAGNOSIS — G93.41: ICD-10-CM

## 2019-08-31 DIAGNOSIS — I11.0: ICD-10-CM

## 2019-08-31 DIAGNOSIS — G91.2: ICD-10-CM

## 2019-08-31 LAB
ALBUMIN SERPL-MCNC: 2.6 G/DL (ref 3.4–5)
ALP SERPL-CCNC: 141 U/L (ref 46–116)
ALT SERPL-CCNC: 13 U/L (ref 14–59)
AST SERPL-CCNC: 21 U/L (ref 15–37)
BASOPHILS NFR BLD: 0.2 % (ref 0–2)
BILIRUB SERPL-MCNC: 0.9 MG/DL (ref 0.2–1)
BUN SERPL-MCNC: 14 MG/DL (ref 7–18)
CALCIUM SERPL-MCNC: 8.6 MG/DL (ref 8.5–10.1)
CHLORIDE SERPL-SCNC: 103 MMOL/L (ref 98–107)
CO2 SERPL-SCNC: 35.1 MMOL/L (ref 21–32)
CREAT SERPL-MCNC: 0.5 MG/DL (ref 0.6–1)
ERYTHROCYTE [DISTWIDTH] IN BLOOD BY AUTOMATED COUNT: 18.8 % (ref 11.5–14.5)
GFR SERPLBLD BASED ON 1.73 SQ M-ARVRAT: (no result) ML/MIN
GLUCOSE SERPL-MCNC: 137 MG/DL (ref 74–106)
MICROSCOPIC UR-IMP: YES
PLATELET # BLD: 161 X10^3MCL (ref 130–400)
POTASSIUM SERPL-SCNC: 4 MMOL/L (ref 3.5–5.1)
PROT SERPL-MCNC: 6.8 G/DL (ref 6.4–8.2)
RBC # UR STRIP.AUTO: (no result) /UL
SODIUM SERPL-SCNC: 142 MMOL/L (ref 136–145)
UA SPECIFIC GRAVITY: <=1.005 (ref 1–1.03)

## 2019-08-31 PROCEDURE — G0378 HOSPITAL OBSERVATION PER HR: HCPCS

## 2019-08-31 PROCEDURE — A4628 OROPHARYNGEAL SUCTION CATH: HCPCS

## 2019-08-31 PROCEDURE — P9047 ALBUMIN (HUMAN), 25%, 50ML: HCPCS

## 2019-08-31 PROCEDURE — C9113 INJ PANTOPRAZOLE SODIUM, VIA: HCPCS

## 2019-08-31 NOTE — NUR
PLACED URINARY STRAIGHT CATHETER TO OBTAIN URINE SAMPLE. PT TOLERATE WELL. NO
SIGNS OF DISTRESS NOTED.

## 2019-08-31 NOTE — NUR
MEDICATE PT PER MD ORDERS, SEE EMAR, ALBUMNIN INFUSING NO PROB, NO INFILTRATION
NOTED OR PAIN AT IV SITE.

## 2019-08-31 NOTE — NUR
PT BIB [facility], PER MEDIC THE STAFF AT [facility] CALLED 911 WITH
C/O SHORTNESS OF BREATH AND LETHARGY PTA. PT WAS RELEASED FROM [facility] AND TREATED
FOR A UTI AND PNEUMONIA. PER MEDIC. PT FINISHED MEDICATIONS FOR TREATMENTS 3
DAYS AGO. PER MEDIC, UPON THEIR ARRIVAL TO [facility] PT SPO2 WAS 93% ON 3L
O2 VIA NASAL CANULA, PER MEDIC 1 BREATHING TREATMENT WAS PERFORMED PTA. PER
MEDIC BLOOD GLUCOSE , ON SCENE GCS WAS 8. PER MEDIC 2 UNSUCCESSFUL
ATTEMPTS MADE TO PLACE IV. NO IV ACCESS ESTABLISHED
ON PRESENTATION, PT HAD EYES CLOSED, GCS: 2,2,4 =8 AT THIS TIME,
 GARBLED SPEECH, UNABLE TO FOLLOW COMMANDS, 
MOVING ALL EXTREMITIES ON HER OWN, RESP E/U, DIMINSHED LUNG SOUNDS NOTED TO 
BILATERAL BASES, BLANCHABLE SKIN NOTED TO SACRUM, G-TUBE NOTED TO LLQ, SKIN IS
DRY, WARM AND PINK. PT PLACED IN FULL CM, A-FIB NOTED AT THIS TIME, MD AWARE.
AWAITING MSE BY MD. PT ON 1L O2 VIA NC AT THIS TIME, SPO2 @95%. WILL CONT TO
MONITOR. PER MEDIC PATIENT'S DAUGHTER IS ON HER WAY TO HOSPITAL. SIDERAIL X2 UP
FOR SAFETY AND BED IN LOWEST POSITION.

## 2019-09-01 VITALS — SYSTOLIC BLOOD PRESSURE: 93 MMHG | DIASTOLIC BLOOD PRESSURE: 43 MMHG

## 2019-09-01 VITALS — SYSTOLIC BLOOD PRESSURE: 100 MMHG | DIASTOLIC BLOOD PRESSURE: 56 MMHG

## 2019-09-01 VITALS — SYSTOLIC BLOOD PRESSURE: 128 MMHG | DIASTOLIC BLOOD PRESSURE: 67 MMHG

## 2019-09-01 VITALS — SYSTOLIC BLOOD PRESSURE: 108 MMHG | DIASTOLIC BLOOD PRESSURE: 57 MMHG

## 2019-09-01 VITALS — SYSTOLIC BLOOD PRESSURE: 94 MMHG | DIASTOLIC BLOOD PRESSURE: 51 MMHG

## 2019-09-01 VITALS — DIASTOLIC BLOOD PRESSURE: 65 MMHG | SYSTOLIC BLOOD PRESSURE: 124 MMHG

## 2019-09-01 VITALS — SYSTOLIC BLOOD PRESSURE: 118 MMHG | DIASTOLIC BLOOD PRESSURE: 68 MMHG

## 2019-09-01 VITALS — SYSTOLIC BLOOD PRESSURE: 116 MMHG | DIASTOLIC BLOOD PRESSURE: 60 MMHG

## 2019-09-01 VITALS — DIASTOLIC BLOOD PRESSURE: 46 MMHG | SYSTOLIC BLOOD PRESSURE: 103 MMHG

## 2019-09-01 VITALS — DIASTOLIC BLOOD PRESSURE: 74 MMHG | SYSTOLIC BLOOD PRESSURE: 116 MMHG

## 2019-09-01 VITALS — SYSTOLIC BLOOD PRESSURE: 116 MMHG | DIASTOLIC BLOOD PRESSURE: 62 MMHG

## 2019-09-01 VITALS — SYSTOLIC BLOOD PRESSURE: 118 MMHG | DIASTOLIC BLOOD PRESSURE: 71 MMHG

## 2019-09-01 VITALS — SYSTOLIC BLOOD PRESSURE: 105 MMHG | DIASTOLIC BLOOD PRESSURE: 58 MMHG

## 2019-09-01 VITALS — DIASTOLIC BLOOD PRESSURE: 68 MMHG | SYSTOLIC BLOOD PRESSURE: 130 MMHG

## 2019-09-01 VITALS — SYSTOLIC BLOOD PRESSURE: 99 MMHG | DIASTOLIC BLOOD PRESSURE: 52 MMHG

## 2019-09-01 VITALS — SYSTOLIC BLOOD PRESSURE: 128 MMHG | DIASTOLIC BLOOD PRESSURE: 71 MMHG

## 2019-09-01 VITALS — SYSTOLIC BLOOD PRESSURE: 107 MMHG | DIASTOLIC BLOOD PRESSURE: 62 MMHG

## 2019-09-01 VITALS — SYSTOLIC BLOOD PRESSURE: 112 MMHG | DIASTOLIC BLOOD PRESSURE: 50 MMHG

## 2019-09-01 LAB
BASOPHILS NFR BLD: 0 % (ref 0–2)
BASOPHILS NFR BLD: 0.1 % (ref 0–2)
BASOPHILS NFR BLD: 0.5 % (ref 0–2)
BUN SERPL-MCNC: 14 MG/DL (ref 7–18)
CALCIUM SERPL-MCNC: 8.5 MG/DL (ref 8.5–10.1)
CHLORIDE SERPL-SCNC: 103 MMOL/L (ref 98–107)
CO2 SERPL-SCNC: 33.7 MMOL/L (ref 21–32)
CREAT SERPL-MCNC: 0.7 MG/DL (ref 0.6–1)
ERYTHROCYTE [DISTWIDTH] IN BLOOD BY AUTOMATED COUNT: 18 % (ref 11.5–14.5)
ERYTHROCYTE [DISTWIDTH] IN BLOOD BY AUTOMATED COUNT: 18.4 % (ref 11.5–14.5)
ERYTHROCYTE [DISTWIDTH] IN BLOOD BY AUTOMATED COUNT: 18.8 % (ref 11.5–14.5)
GFR SERPLBLD BASED ON 1.73 SQ M-ARVRAT: (no result) ML/MIN
GLUCOSE SERPL-MCNC: 132 MG/DL (ref 74–106)
MAGNESIUM SERPL-MCNC: 2 MG/DL (ref 1.8–2.4)
PHOSPHATE SERPL-MCNC: 3.5 MG/DL (ref 2.5–4.9)
PLATELET # BLD: 130 X10^3MCL (ref 130–400)
PLATELET # BLD: 135 X10^3MCL (ref 130–400)
PLATELET # BLD: 145 X10^3MCL (ref 130–400)
POTASSIUM SERPL-SCNC: 3.7 MMOL/L (ref 3.5–5.1)
SODIUM SERPL-SCNC: 145 MMOL/L (ref 136–145)

## 2019-09-01 PROCEDURE — 5A1955Z RESPIRATORY VENTILATION, GREATER THAN 96 CONSECUTIVE HOURS: ICD-10-PCS | Performed by: INTERNAL MEDICINE

## 2019-09-01 PROCEDURE — 0BH17EZ INSERTION OF ENDOTRACHEAL AIRWAY INTO TRACHEA, VIA NATURAL OR ARTIFICIAL OPENING: ICD-10-PCS | Performed by: EMERGENCY MEDICINE

## 2019-09-01 NOTE — NUR
THE PATIENT REMAINS INTUBATED AND SEDATED WITH PROPOFOL AT 5MCG/KG/MIN. THE
PATIENT RESPONSIVE TO TACTILE STIMULI BUT UNABLE TO FOLLOW COMMANDS. EARL
PUPILS WITH SLUGGISH REACTION TO LIGHT, BUT LEFT NOTED IRREGULAR SHAPE. GAG
REFLEX PRESENTS UPON SUCTIONING.
PROPOFOL IS TITRATED DOWN TO 3MCG/KG/MIN.
ETT 7.5 ETT IS SECURED IN PLACE AND AT 23CM AT LIPLINE. ETT TO VENT VIA VCV/AC
MODE: FIO2 40%, RATE 18, , AND PEEP 5. ORAL CARE PROVIDED TO THE
PATIENT. OGT REMOVED BY THE CHARGE NURSE AS ORDERED.
TELE # 3 READS AFIB AT THIS TIME.
PEG TUBE IN PLACE TO UPPER ABDOMEN AND CLAMPED AT THIS TIME.
DIAZ CATH TO GRAVITY DRAINING DARK YELLOW COLORED URINE OUTPUT.
IV SITES TO LFA AND LEFT WRIST.
SOFT WRIST RESTRAINTS TO BOTH WRISTS TO PREVENT PATIENT FROM PULLING AT
TUBE/LINE.
SCDS APPLIED TO BLE.
CALL LIGHT WITHIN REACH.
SIDE RAILS UP X3.
BED IS AT LOWEST POSITION.
HEAD OF BED ELEVATED AND BLE ELEVATED.

## 2019-09-01 NOTE — NUR
NO GASTRIC RESIDUAL NOTED AT THIS TIME. TUBE FEEDING RATE INCREASED FROM
20ML/HR TO 30ML/HR TO REACH THE GOAL AS ORDERED.

## 2019-09-01 NOTE — NUR
INCREASED RESPIRATORY RATE NOTED. RESP SHALLOW AND EVEN, RESP 36BPM, SPO2 @75%,
PLACED NONREBREATHER MASK TO PATIENT, SPO2 IMPROVED TO 96% ON 15L O2. NOTIFIED
DR MCCULLOUGH. DR MCCULLOUGH REASSESSED PATIENT, ORDERED A BREATHING TREATMENT
STAT. RT NOTIFIED. WILL CONT TO MONITOR.

## 2019-09-01 NOTE — NUR
RECEIEVED PT INTUBATED WITH NO SEDATION. PT IS RESPONSIVE TO PAINFUL
STIMULI. UNABLE TO FOLLOW COMMANDS. L PUPIL 4MM IRREGULAR, SLUGGISH AND R
PUPIL 3MM SLUGGISH. 7.5 ETT AT 23CM LL INTACT AND SECURED. ON VENT VCV-AC MODE
WITH SETTINGS OF , R 18, FIO2 30%, AND PEEP 5. BREATHING E/U. LUNG
SOUNDS CLEAR TO UPPER LOBES AND DIMINISHED TO BASES.  S1S2 AUSCULATATED WITH
NO MURMURS NOTED. A FIB. ON CARDIAC MONITOR. NO S/SX OF ANY PAIN NOTED. R
HAND 22G AND L FOREARM IV SITES WNL, DRESSINGS CDI. CAP REFILL < 3 SEC. +1
PITTING EDEMA TO BUE AND TRACE EDEMA TO BLE. SCD'S IN PLACE TO BLE'S. ABDOMEN
ROUND, NONTENDER. GLUCERNA 1.2 INCREASED FROM 30CC/HR TO 40CC/HR WITH GOAL
FEED OF 50CC/HR. PLACEMENT CHECKED WITH NO RESIDUAL NOTED.  NO N/V. DIAZ
CATHETER IN PLACE DRAINING VIA GRAVITY YELLOW URINE. ECCYMOSIS TO BUE AND DARK
DISCOLORATIONS TO BLE. OPTIFOAM TO SARCRAL AREA. BED IN LOW POSITION. CALL
LIGHT WITHIN REACH. FAMILY AT BEDSIDE. WILL CONTINUE TO MONITOR.

## 2019-09-01 NOTE — NUR
PEG TUBE IS CONNECTED TO TUBE FEEDING; TUBE FEEDING WITH GLUCERNA IS INITIATED
AT 20ML/HR AND FREE WATER FLUSH 20ML Q4HR FOR THE PATIENT AS ORDERED.

## 2019-09-01 NOTE — NUR
PROPOFOL DRIP INFUSING AT 5MCG/KG/MIN AT THIS TIME. NO TITRATION NEEDED, RSS=4
RT AT THE BEDSIDE AT THIS TIME

## 2019-09-01 NOTE — NUR
PROPOFOL DRIP INFUSING @ 5MC/KG/MIN, NO INFILTRATION NOTED TO SITE, RSS=4, NO
 CHANGE IN PROPOFOL DRIP NEEDED AT THIS TIME. PT ON FULL CM, VSS,
 WILL CONT TO MONITOR. DAUGHTER AT THE BEDSIDE.

## 2019-09-01 NOTE — NUR
DR. ELIZABETH-PULMONOLOGIST IS AT BEDSIDE WITH THE CHARGE NURSE. DOCTOR IS
EXAMING THE PATIENT. UPDATE PROVIDED TO THE DOCTOR.

## 2019-09-01 NOTE — NUR
PROPOFOL DRIP INFUSING @ 5MC/KG/MIN, NO INFILTRATION NOTED TO SITE, RSS=4, NO
CHANGE IN PROPOFOL DRIP NEEDED AT THIS TIME. PT ON FULL CM, MD AWARE OF VSS,
WILL CONT TO MONITOR

## 2019-09-01 NOTE — NUR
IV SITE AT LEFT WRIST LEAKING. THE HEPLOCK REMOVED WITH CATH INTACT. NEW IV
SITE INSERTED TO RIGHT HAND WITH # 22G.

## 2019-09-01 NOTE — NUR
SOFT RESTRAINTS PLACED TO BILATERAL DISTAL EXTREMITIES FOR MEDICAL RESTRAINT.
PT IS INTUBATED AT THIS TIME.

## 2019-09-01 NOTE — NUR
BREATHING TREATMENT FINISHED. RT PLACED PT ON NON REBREATHER FACE MASK AT 15L
O2. SPO2 @100% AT THIS TIME. RESP SHALLOW/EVEN, 36 BREATHS PER MINUTE NOTED.

## 2019-09-01 NOTE — NUR
PATIENT REMAINS ON VENT VIA VCV/AC MODE: FIO2 30%, RATE 18,  AND PEEP 5
WITH NO SEDATION. THE PATIENT STILL CLOSING EYES; OPENING EYES TO
TACTILE/PAINFUL STIMULI, THEN CLOSING HER EYES AGAIN. THE FAMILY IS AT
BEDSIDE.

## 2019-09-01 NOTE — NUR
PROPOFOL DRIP INFUSING @ 5MC/KG/MIN, NO INFILTRATION NOTED TO SITE, RSS=4, NO
 CHANGE IN PROPOFOL DRIP NEEDED AT THIS TIME. PT ON FULL CM, MD AWARE OF VSS,
PER MD CONTINUE PLAN OF CARE.
 WILL CONT TO MONITOR

## 2019-09-01 NOTE — NUR
SPOKE TO ANASTACIO IN ICU, ADVISED JUDY IS UNABLE TO TAKE REPORT AT THIS TIME.
ANASTACIO INFORMED ME THAT HE WOULD CALL BACK IN 5 MINUTES.

## 2019-09-01 NOTE — NUR
PROPOFOL DRIP INFUSING AT 5MCG/KG/MIN AT THIS TIME. NO TITRATION NEEDED,
RSS=4, MD MADE AWARE OF VITAL SIGNS, PER MD CONTINUE PLAN OF CARE.

## 2019-09-01 NOTE — NUR
RECIEVED PT FROM ER. TRANSFERED TO ICU. NURSING UPDATES POC DISCUSSED.
 
PT INTUBATED AND SEDATED W/ PROPOFOL 5MCG/KG/MIN. RSS5. PT UNABLE TO RESPOND
TO VERBAL COMMANDS. RESPONDS TO PAINFUL STIMULUS. EARL PUPILS FIXED L EYE PUPIL
4MM, R EYE PUPIL 2MM BOTH NONREACTIVE TO LIGHT. VENTED TO AC MODE PEEP 5, VT
450, O2 50%, RR 14. NO S/S OF RESP DISTRESS. PULSES WEAK BUE&BLE. CAP REFILL <
3 SEC. HR NOTED W/ PREVIOUS AFIB. HR 71. SKIN W/ NOTED SACRAL AREA W/ REDNESS
OPTIFOAM APPLIED. OGT AND PEG TUBE SECURE AND INTACT. R HAND AND R FA IV'S
C/D/I. F/C SECURE AND INTACT DRAINING FREELY TO GRAVITY URINE ARIANA W/ FAIR
OUTPUT. SOFT WRIST RESTRAINTS EARL UPPER EXTREMITIES.

## 2019-09-02 VITALS — DIASTOLIC BLOOD PRESSURE: 66 MMHG | SYSTOLIC BLOOD PRESSURE: 115 MMHG

## 2019-09-02 VITALS — SYSTOLIC BLOOD PRESSURE: 100 MMHG | DIASTOLIC BLOOD PRESSURE: 52 MMHG

## 2019-09-02 VITALS — DIASTOLIC BLOOD PRESSURE: 60 MMHG | SYSTOLIC BLOOD PRESSURE: 106 MMHG

## 2019-09-02 VITALS — DIASTOLIC BLOOD PRESSURE: 56 MMHG | SYSTOLIC BLOOD PRESSURE: 106 MMHG

## 2019-09-02 VITALS — SYSTOLIC BLOOD PRESSURE: 98 MMHG | DIASTOLIC BLOOD PRESSURE: 60 MMHG

## 2019-09-02 VITALS — SYSTOLIC BLOOD PRESSURE: 100 MMHG | DIASTOLIC BLOOD PRESSURE: 60 MMHG

## 2019-09-02 VITALS — SYSTOLIC BLOOD PRESSURE: 94 MMHG | DIASTOLIC BLOOD PRESSURE: 53 MMHG

## 2019-09-02 VITALS — DIASTOLIC BLOOD PRESSURE: 55 MMHG | SYSTOLIC BLOOD PRESSURE: 91 MMHG

## 2019-09-02 VITALS — SYSTOLIC BLOOD PRESSURE: 100 MMHG | DIASTOLIC BLOOD PRESSURE: 53 MMHG

## 2019-09-02 VITALS — DIASTOLIC BLOOD PRESSURE: 56 MMHG | SYSTOLIC BLOOD PRESSURE: 102 MMHG

## 2019-09-02 VITALS — SYSTOLIC BLOOD PRESSURE: 121 MMHG | DIASTOLIC BLOOD PRESSURE: 60 MMHG

## 2019-09-02 VITALS — DIASTOLIC BLOOD PRESSURE: 76 MMHG | SYSTOLIC BLOOD PRESSURE: 115 MMHG

## 2019-09-02 VITALS — SYSTOLIC BLOOD PRESSURE: 114 MMHG | DIASTOLIC BLOOD PRESSURE: 75 MMHG

## 2019-09-02 VITALS — DIASTOLIC BLOOD PRESSURE: 64 MMHG | SYSTOLIC BLOOD PRESSURE: 95 MMHG

## 2019-09-02 VITALS — SYSTOLIC BLOOD PRESSURE: 134 MMHG | DIASTOLIC BLOOD PRESSURE: 83 MMHG

## 2019-09-02 LAB
BASOPHILS NFR BLD: 0.1 % (ref 0–2)
BUN SERPL-MCNC: 18 MG/DL (ref 7–18)
CALCIUM SERPL-MCNC: 8.3 MG/DL (ref 8.5–10.1)
CHLORIDE SERPL-SCNC: 103 MMOL/L (ref 98–107)
CO2 SERPL-SCNC: 32 MMOL/L (ref 21–32)
CREAT SERPL-MCNC: 0.9 MG/DL (ref 0.6–1)
ERYTHROCYTE [DISTWIDTH] IN BLOOD BY AUTOMATED COUNT: 18.8 % (ref 11.5–14.5)
GFR SERPLBLD BASED ON 1.73 SQ M-ARVRAT: (no result) ML/MIN
GLUCOSE SERPL-MCNC: 157 MG/DL (ref 74–106)
MAGNESIUM SERPL-MCNC: 2 MG/DL (ref 1.8–2.4)
PHOSPHATE SERPL-MCNC: 4.1 MG/DL (ref 2.5–4.9)
PLATELET # BLD: 127 X10^3MCL (ref 130–400)
POTASSIUM SERPL-SCNC: 3.4 MMOL/L (ref 3.5–5.1)
SODIUM SERPL-SCNC: 143 MMOL/L (ref 136–145)

## 2019-09-02 NOTE — NUR
PT FOUND HAVING TONIC CLONIC SEIZURE THAT LASTED ~45 SEC WHEN XRAY WAS AT
BEDSIDE DOING CHEST XRAY. SEIZURE STOPPED, THEN PT GIVEN 50MCG FENTANYL IVP &
1MG VERSED IVP. WILL CONTINUE TO MONITOR.

## 2019-09-02 NOTE — NUR
VENT RATE DECREASED DOWN TO 14 FROM 18 AND VT DECREASED  FROM 450 D/T
RESP ALKALOSIS PER DR. PETIT. DEANNA CONTINE TO MONITOR.

## 2019-09-02 NOTE — NUR
NOTED CRITICAL CXR FINDING BUT DID NOT RECIEVE PHONE CALL FROM RADIOLOGY IN
REGARDS TO CRITICAL FINDING. DR. PETIT PAGED AND NOTIFIED PER CXR ETT IS
0.7CM BEYOND MARCIO. ORDERS RECIEVED TO RETRACT ETT 2CM. RT DARRYN IN UNIT AND
MADE AWARE. PRIMARY RN ALSO MADE AWARE.

## 2019-09-02 NOTE — NUR
DR. MORA AND RESIDENTS ROUNDED ON PT AT BEDSIDE. UPDATES PROVIDED, QUESTIONS
ADDRESSED. NO CHANGES TO POC AT THIS TIME.

## 2019-09-02 NOTE — NUR
PT HAD A SECOND TONIC CLONIC SEIZURE LASTING 1 MINUTE, STOPPED WITHOUT
INTERVENTION. RIGHT PUPIL 3MM, SLUGGISH RESPONSE TO LIGHT & LEFT PUPIL
IRREGULAR SHAPE, NO RESPONSE TO LIGHT. PT GIVEN 50 MCG FENTANYL AND 1 MG
VERSED IVP AFTER SEIZURE STOPPED. PT NOT WITHDRAWING TO PAINFUL STIMULUS, (+)
COUGH, (-) GAG REFLEX WITH SUCTIONING OROPHARYNX WITH YANKAUER. WILL CONTINUE
TO MONITOR PT.

## 2019-09-02 NOTE — NUR
RETURNED TO UNIT FROM STAT HEAD CT. PT TOLERATED TRANSPORT AND SCAN WELL, PT
STABLE IN ROOM AT THIS TIME. AWAITING RESULTS TO BE READ. WILL CONTINUE TO
MONITOR PT.

## 2019-09-02 NOTE — NUR
RADIOLOGY CALLED REGARDING CXR WITH ETT TERMINATING IN R MAINSTEM BRONCHUS.
DR. PETIT AND RT AWARE, ETT TO BE WITHDRAWN AND PLACEMENT TO BE REEVALUATED
WITH F/U CXR. WILL CONTINUE TO MONITOR PT.

## 2019-09-02 NOTE — NUR
TELECONSULT WITH NEUROLOGIST COMPLETED. PT'S CONDITION RELAYED TO ,
QUESTIONS ANSWERED. NEUROLOGIST SAID HE WILL FOLLOW UP WITH DR. GIFFORD.

## 2019-09-02 NOTE — NUR
RECEIVED REPORT AND PT FROM MADDIE OSULLIVAN. RECEIVED PT INTUBATED, NO SEDATION
DRIPS AT THIS TIME. PT UNABLE TO FOLLOW SIMPLE COMMANDS, RESPONDS TO PAINFUL
STIMULI. PUPIL 3MM BRISK RESPONSE TO LIGHT TO RIGHT EYE, LEFT EYE CATARACT SX,
FIXED RESPONSE. PT EYES DO NOT OPEN SPONTANEOUSLY, DOES DOT TRACK. NO FACIAL
DROOP NOTED. SEIZURE PRECAUTIONS INTACT, NONE AT THIS TIME.PT INTUBATED TO
VENT, INTACT. TRACHEA MIDLINE, NO DRAINAGE TO EENT. NO JVD NOTED.LUNG SOUNDS
CLEAR TO BUL AND CLEAR TO BLL. ORAL CARE PROVIDED PER VAP PROTOCOL. 7.5 ETT,
21 LL. VENT SETTINGS VCV-AC MODE, FIO2 30%, RATE 14, , PEEP 5. CHEST
RISE/FALL SYMMETRIC, E/U BREATHING, NO ACUTE RESP DISTRESS.S1/S2 SOUNDS, NO
S/S OF CHEST PAIN. AFIB ON MONITOR, CONTROLLED RATE.SKIN COLOR CONSISTENT WITH
ETHNICITY, CAP REFILL <3 SEC X4 EXTREMITIES, PULSES MODERATE TO BUE, WEAK BUT
PALPABLE TO BLE. TRACE EDEMA TO BLE. SCD'S INTACT TO BLE. NS INFUSING @ 50
ML/HR.GEN WEAKNESS NOTED. NO CONTRACTURES/DEFORMTIES, PT ON TURN SCHED Q2H. NO
RESTRAINTS AT THIS TIME, WILL REASSESS NEED. NO JOINT SWELLING/TENDERNESS.PEG
TO LUQ, INFUSING GLUCERNA @ 50 ML/HR, FWF 20 ML Q4H. GRV=10 ML.ACTIVE BOWEL
SOUNDS X4 QUADRANTS, ABD SOFT/FLAT. NO BM NOTED.F/C DRAINING TO GRAVITY ARIANA
URINE, NO LABIAL EDEMA OR DISCHARGE NOTED, INTACT AND SECURED.IV TO RH, LFA,
PORTS PATENT, NO S/S OF INFILTRATION, DRESSING CDI. SKIN WARM/DRY TO TOUCH.
HEALING WOUND NOTED TO SACRUM, OPTIFOAM CDI. UPPER LIP HEALING SCAB NOTED,
AMALIA. DARK DISCOLORATION NOTED TO BLE, ECCHYMOSIS NOTED TO BUE, INTACT. BED AT
LOWEST SETTING, CALL LIGHT WITHIN REACH. HOB ELEVATED 30 DEGREES. TEACHING
PROVIDED TO FAMILY. WILL CONT TO MONITOR.

## 2019-09-03 VITALS — SYSTOLIC BLOOD PRESSURE: 114 MMHG | DIASTOLIC BLOOD PRESSURE: 68 MMHG

## 2019-09-03 VITALS — SYSTOLIC BLOOD PRESSURE: 97 MMHG | DIASTOLIC BLOOD PRESSURE: 49 MMHG

## 2019-09-03 VITALS — SYSTOLIC BLOOD PRESSURE: 115 MMHG | DIASTOLIC BLOOD PRESSURE: 64 MMHG

## 2019-09-03 VITALS — SYSTOLIC BLOOD PRESSURE: 111 MMHG | DIASTOLIC BLOOD PRESSURE: 59 MMHG

## 2019-09-03 VITALS — SYSTOLIC BLOOD PRESSURE: 118 MMHG | DIASTOLIC BLOOD PRESSURE: 66 MMHG

## 2019-09-03 VITALS — DIASTOLIC BLOOD PRESSURE: 59 MMHG | SYSTOLIC BLOOD PRESSURE: 110 MMHG

## 2019-09-03 VITALS — SYSTOLIC BLOOD PRESSURE: 117 MMHG | DIASTOLIC BLOOD PRESSURE: 65 MMHG

## 2019-09-03 VITALS — DIASTOLIC BLOOD PRESSURE: 64 MMHG | SYSTOLIC BLOOD PRESSURE: 118 MMHG

## 2019-09-03 VITALS — DIASTOLIC BLOOD PRESSURE: 49 MMHG | SYSTOLIC BLOOD PRESSURE: 97 MMHG

## 2019-09-03 VITALS — DIASTOLIC BLOOD PRESSURE: 77 MMHG | SYSTOLIC BLOOD PRESSURE: 118 MMHG

## 2019-09-03 VITALS — SYSTOLIC BLOOD PRESSURE: 96 MMHG | DIASTOLIC BLOOD PRESSURE: 57 MMHG

## 2019-09-03 VITALS — DIASTOLIC BLOOD PRESSURE: 49 MMHG | SYSTOLIC BLOOD PRESSURE: 96 MMHG

## 2019-09-03 VITALS — DIASTOLIC BLOOD PRESSURE: 57 MMHG | SYSTOLIC BLOOD PRESSURE: 96 MMHG

## 2019-09-03 VITALS — DIASTOLIC BLOOD PRESSURE: 59 MMHG | SYSTOLIC BLOOD PRESSURE: 103 MMHG

## 2019-09-03 VITALS — DIASTOLIC BLOOD PRESSURE: 73 MMHG | SYSTOLIC BLOOD PRESSURE: 122 MMHG

## 2019-09-03 VITALS — SYSTOLIC BLOOD PRESSURE: 124 MMHG | DIASTOLIC BLOOD PRESSURE: 70 MMHG

## 2019-09-03 VITALS — SYSTOLIC BLOOD PRESSURE: 107 MMHG | DIASTOLIC BLOOD PRESSURE: 51 MMHG

## 2019-09-03 LAB
BASOPHILS NFR BLD: 0.2 % (ref 0–2)
BUN SERPL-MCNC: 22 MG/DL (ref 7–18)
CALCIUM SERPL-MCNC: 7.8 MG/DL (ref 8.5–10.1)
CHLORIDE SERPL-SCNC: 104 MMOL/L (ref 98–107)
CO2 SERPL-SCNC: 31.4 MMOL/L (ref 21–32)
CREAT SERPL-MCNC: 0.8 MG/DL (ref 0.6–1)
ERYTHROCYTE [DISTWIDTH] IN BLOOD BY AUTOMATED COUNT: 18.7 % (ref 11.5–14.5)
GFR SERPLBLD BASED ON 1.73 SQ M-ARVRAT: (no result) ML/MIN
GLUCOSE SERPL-MCNC: 132 MG/DL (ref 74–106)
MAGNESIUM SERPL-MCNC: 2.2 MG/DL (ref 1.8–2.4)
PHOSPHATE SERPL-MCNC: 3.7 MG/DL (ref 2.5–4.9)
PLATELET # BLD: 132 X10^3MCL (ref 130–400)
POTASSIUM SERPL-SCNC: 4.2 MMOL/L (ref 3.5–5.1)
SODIUM SERPL-SCNC: 141 MMOL/L (ref 136–145)

## 2019-09-03 NOTE — NUR
RECEIVED PT INTUBATED, NO SEDATION DRIPS AT THIS TIME. PT UNABLE TO FOLLOW
SIMPLE COMMANDS, RESPONDS TO PAINFUL STIMULI. PUPIL 3MM BRISK RESPONSE TO
LIGHT TO RIGHT EYE, LEFT EYE CATARACT SX, FIXED RESPONSE. PT EYES OPEN SPONTAN
BUT DOES NOT TRACK. NO FACIAL DROOP NOTED. SEIZURE PRECAUTIONS INTACT,
NONE AT THIS TIME.PT INTUBATED TO VENT, INTACT. TRACHEA MIDLINE, NO DRAINAGE
TO EENT. NO JVD NOTED.LUNG SOUNDS CLEAR TO BUL AND DIM TO BLL. ORAL CARE
PROVIDED PER VAP PROTOCOL. 7.5 ETT, 21 LL. VENT SETTINGS VCV-AC MODE, FIO2
30%, RATE 12, , PEEP 5. CHEST RISE/FALL SYMMETRIC, E/U BREATHING, NO
ACUTE RESP DISTRESS.S1/S2 SOUNDS, NO S/S OF CHEST PAIN. AFIB ON MONITOR,
CONTROLLED RATE.SKIN COLOR CONSISTENT WITH ETHNICITY, CAP REFILL <3 SEC X4
EXTREMITIES, PULSES MODERATE TO BUE/BLE. SCD'S INTACT TO BLE.GEN WEAKNESS
NOTED. NO CONTRACTURES/DEFORMTIES, PT ON TURN SCHED Q2H. NO RESTRAINTS AT THIS
TIME, WILL REASSESS NEED. NO JOINT SWELLING/TENDERNESS.PEG TO LUQ, INFUSING
GLUCERNA @ 50 ML/HR,  ML Q6H. GRV=10 ML AND REPLACED.ACTIVE BOWEL
SOUNDS X4 QUADRANTS, ABD SOFT/FLAT. NO BM NOTED.F/C DRAINING TO GRAVITY ARIANA
URINE, NO LABIAL EDEMA OR DISCHARGE NOTED, INTACT AND SECURED.IV TO RH, LFA,
PORTS PATENT, NO S/S OF INFILTRATION, DRESSING CDI. SKIN WARM/DRY TO TOUCH.
UPPER LIP SCAB AND OPEN WOUND NOTED, AMALIA. HEALED SACRAL WOUND NOTED, ZGUARD
APPLIED, WITH OPTIFOAM CDI. DARK DISCOLORATION NOTED TO BLE, ECCHYMOSIS NOTED
TO BUE, INTACT AND AMALIA. HEELS AND BLE OFFLOADED WITH PILLOWS ,PT ON TURN SCHED
Q2H. TEACHING PROVIDED TO FAMILY. HOB ELEVEATED 30 DEGREES, CALL LIGHT WITHIN
REACH, BED AT LOWEST SETTING. WILL CONT TO MONITOR.

## 2019-09-03 NOTE — NUR
1. Recommend continuing Glucerna 1.2 @ 50 ml/hr, FWF (per MD). This will
provide 1440 kcal and 72g protein. This will meet >75% of estimated calorie
and 100% estimated protein needs of the patient.

## 2019-09-03 NOTE — NUR
PT HAD A TONIC CLONIC SEIZURE OCCURED AT THIS TIME, APPROXIAMTELY 15 SECONDS.
PATIETS HOB ELEVATED, AND SUCTIONING AROUND MOUTH FOR SECRETIONS. VERSED WAS
GIVEN, SEE EMAR.  SEIZURE PRECAUTIONS INTACT. WILL CONTINUE TO MONITOR.

## 2019-09-03 NOTE — NUR
DR. PETIT IN UNIT TO SEE AND ASSESS PT. UPDATES PROVIDED BY PRIMARY RN. PER
DR. PETIT INCREASE KEPPRA TO 1,000 MG Q12 HRS. WILL CARRY OUT ORDERS AND
CONT TO MONITOR.

## 2019-09-03 NOTE — NUR
DR. PETIT CALLED AT THIS TIME AND STS THAT SHE WILL BE HERE TOMORROW, 9/4 AT
6PM FOR FAMILY MEETING. DAUGHTER MADE AWARE AT THIS TIME.

## 2019-09-03 NOTE — NUR
Initial Nutrition Assessment: IC03/A LANE[facility] IA HR
 
Dx: UTI
PMHx: Afib, recurrent UTIs, dementia, HTN, DM, pulmonary HTN, aortic stenosis,
CHF, tricuspid insufficiency, anemia
PSHx: not documented
Labs: BG 132H, BUN 22H, HGB 8.2L
Meds: Colace, D 50%, humulin, Levaquin, Lipitor, vancomycin, zofran
Diet: Glucerna 1.2 @ 20 ml/hr, goal 50 ml/hr
PO Intake: NPO
Ht: 157.48 cm (62")   Wt: 59 kg (130#)  BMI: 23.8 kg/m2   Bed scale: 59 kg
IBW: 110# (50 kg) %IBW: 118  UBW: unable to access
Age: 79/F
Food Allergies: Iodine
Skin: upper lip scab/ open wound noted, dark discoloration to BLE, heeled
wound noted to sacrum  Keny: 11
Edema: none
GI: Last BM: none
Per H&P, Pt is a 79 year old female with PMH of Afib, recurrent UTIs,
dementia, HTN, DM, pulmonary HTN, aortic stenosis, CHF, tricuspid
insufficiency, anemia, was brought in with altered level consciousness.
Patient was recently discharged from [facility] [place], [place] last Wednesday after treating
pneumonia and sepsis to [facility].
 
RDN Visit (9/3): Pt is intubated and on ventilator. Patient is not sedated
however she was sleeping. Glucerna 1.2 was running @ goal rate of 50 ml/hr,
 ml Q6H. Per ABRAN Dobson, pt is tolerating tube feeding with no
residuals. Patient has not had a BM since a few days. Per progress note (9/2),
pt has Metabolic encephalopathy 2/2 healthcare acquired pneumonia vs UTI
 
Problem with:  N/V/D/C: none, slight constipation
Problems with: Chewing/Swallowing:  on tube feedings
Current appetite: unable to access
Recent wt change: unable to access  %wt change: n/a
Vitamin/Supplement use: unable to access
Special diet at home: unable to access
Physical activity: unable to access, currently bed bound
Nutrition education given: Not possible at this time.
Food-drug interactions: Colace- high fiber w/9208-3549 ml fluids Education
given: n/a
 
Estimated Nutritional Needs Based on current body weight 59 kg
 Energy: 2713-8131 vs 1190 kcal/d (30-35 kcal/kg vs [facility] 2003)
 Protein: 71-83 g/d (1.2-1.4 g/kg) - UTI, inflammation
 Fluid: 8333-6631 ml/d (1 ml/kcal) or per doctor
 
Nutrition Diagnosis
1. Increased nutrient needs related to increased metabolic demands as
evidenced by patient on ventilator.
2. Malnutrition related to recurrent hospitalizations in the past as evidenced
by muscle wasting.
 
Intervention
1. Recommend continuing Glucerna 1.2 @ 50 ml/hr, FWF (per MD). This will
provide 1440 kcal and 72g protein. This will meet >75% of estimated calorie
and 100% estimated protein needs of the patient.
 
Monitor/Evaluate
 Goal: TF intake at least 75% of estimated needs
 Monitor: TF intake/tolerance, Labs, GI function
 F/U in 2-3 days as high risk 9/5-6

## 2019-09-03 NOTE — NUR
TONIC CLONIC SEIZURE OCCURED AT THIS TIME, APPROXIAMTELY 30 SECONDS. PATIETS
HOB ELEVATED, AND SUCTIONING WAS DONE AS NEEDED. VERSED WAS GIVEN, SEE EMAR.
SEIZURE PRECAUTIONS TAKEN. WILL CONTINUE TO MONITOR.

## 2019-09-03 NOTE — NUR
PATIENT IN BED, BED TO THE LOWEST POSITION. PT IS INTUBATED, AC MODE AT A RATE
OF 14,PEEP OF 5, TIDAL VOLUME  AND RATE FIO2 OF 30%. PATIENT IS
BREATHING ADEQUATELY AND THERE ARE NO SIGNS OF RESPIRATORY DISTRESS NOTED.
PATIENT IS UNABLE TO FOLLOW COMMANDS. PT OPENS EYES SPONTANEOUSLY AT THIS
TIME. CARDIAC MONITOR IN PLACE, AFIB. PT HAS LUQ PEG NOTED. TUBE FEEDINGS
INFUSING AT THIS TIME AT 50 ML/HR WITH 50 FWF Q4. IV ACCESS NOTED TO RH AND
LFA. N/S INFUSING AT THIS TIME, 50 ML/HR. PATIENTS HEELS ARE OFF LOADED WITH
PILLOWS. BILATERAL SEQUENTIAL DEVICES NOTED TO BLE. PATIENT STABLE, WILL
CONTINUE TO MONITOR.

## 2019-09-03 NOTE — NUR
DR. PETIT AT BEDSIDE, ALL UPDATES GIVEN. DR. PETIT INFORMED OF PATIENTS
SEIZURES YESTERDAY AND TODAY. PER DR. PETIT WILL CHANGE [facility] ORDER. WILL
AWAIT ORDER AT THIS TIME.

## 2019-09-03 NOTE — NUR
DR. SKINNER AT BEDSIDE FOR ASSESSMENT. UPDATES PROVIDED. INFORMED HIM OF PT'S
TUBE FEEDING CLARIFICATION AND PT'S I/O STATUS.

## 2019-09-03 NOTE — NUR
PER DAUGHTER SHE CAN NOT BE HERE TOMORROW FOR FAMILY MEETING AND WOULD LIKE TO
RESCHEDULE FOR THURSDAY, DR. PETIT PAGED AT THIS TIME.

## 2019-09-03 NOTE — NUR
DR. GIFFORD AT BEDSIDE UPDATING FAMILY ON PATIENT. PER DR. GIFFORD HE WOULD LIKE
THE FAMILY TO SPEAK WITH DR. PETIT. FAMILY STATES THAT THEY WOULD LIKE FOR A
FAMILY MEETING TOMORROW, 9/4 AT 6PM. DR. PETIT PAGED AT THIS TIME.

## 2019-09-04 VITALS — DIASTOLIC BLOOD PRESSURE: 64 MMHG | SYSTOLIC BLOOD PRESSURE: 98 MMHG

## 2019-09-04 VITALS — DIASTOLIC BLOOD PRESSURE: 55 MMHG | SYSTOLIC BLOOD PRESSURE: 94 MMHG

## 2019-09-04 VITALS — DIASTOLIC BLOOD PRESSURE: 50 MMHG | SYSTOLIC BLOOD PRESSURE: 91 MMHG

## 2019-09-04 VITALS — DIASTOLIC BLOOD PRESSURE: 56 MMHG | SYSTOLIC BLOOD PRESSURE: 94 MMHG

## 2019-09-04 VITALS — DIASTOLIC BLOOD PRESSURE: 56 MMHG | SYSTOLIC BLOOD PRESSURE: 107 MMHG

## 2019-09-04 VITALS — SYSTOLIC BLOOD PRESSURE: 93 MMHG | DIASTOLIC BLOOD PRESSURE: 59 MMHG

## 2019-09-04 VITALS — DIASTOLIC BLOOD PRESSURE: 69 MMHG | SYSTOLIC BLOOD PRESSURE: 109 MMHG

## 2019-09-04 VITALS — DIASTOLIC BLOOD PRESSURE: 60 MMHG | SYSTOLIC BLOOD PRESSURE: 111 MMHG

## 2019-09-04 VITALS — DIASTOLIC BLOOD PRESSURE: 60 MMHG | SYSTOLIC BLOOD PRESSURE: 108 MMHG

## 2019-09-04 VITALS — SYSTOLIC BLOOD PRESSURE: 112 MMHG | DIASTOLIC BLOOD PRESSURE: 63 MMHG

## 2019-09-04 VITALS — SYSTOLIC BLOOD PRESSURE: 117 MMHG | DIASTOLIC BLOOD PRESSURE: 77 MMHG

## 2019-09-04 VITALS — SYSTOLIC BLOOD PRESSURE: 107 MMHG | DIASTOLIC BLOOD PRESSURE: 60 MMHG

## 2019-09-04 VITALS — DIASTOLIC BLOOD PRESSURE: 56 MMHG | SYSTOLIC BLOOD PRESSURE: 104 MMHG

## 2019-09-04 VITALS — SYSTOLIC BLOOD PRESSURE: 134 MMHG | DIASTOLIC BLOOD PRESSURE: 84 MMHG

## 2019-09-04 LAB
BASOPHILS NFR BLD: 0 % (ref 0–2)
BUN SERPL-MCNC: 22 MG/DL (ref 7–18)
CALCIUM SERPL-MCNC: 7.6 MG/DL (ref 8.5–10.1)
CHLORIDE SERPL-SCNC: 104 MMOL/L (ref 98–107)
CO2 SERPL-SCNC: 29.9 MMOL/L (ref 21–32)
CREAT SERPL-MCNC: 0.8 MG/DL (ref 0.6–1)
ERYTHROCYTE [DISTWIDTH] IN BLOOD BY AUTOMATED COUNT: 18.1 % (ref 11.5–14.5)
GFR SERPLBLD BASED ON 1.73 SQ M-ARVRAT: (no result) ML/MIN
GLUCOSE SERPL-MCNC: 141 MG/DL (ref 74–106)
MAGNESIUM SERPL-MCNC: 2.2 MG/DL (ref 1.8–2.4)
PHOSPHATE SERPL-MCNC: 2.9 MG/DL (ref 2.5–4.9)
PLATELET # BLD: 134 X10^3MCL (ref 130–400)
POTASSIUM SERPL-SCNC: 3.7 MMOL/L (ref 3.5–5.1)
SODIUM SERPL-SCNC: 141 MMOL/L (ref 136–145)

## 2019-09-04 NOTE — NUR
THE OLD IV SITE AT LFA LEAKING; THE HEPLOCK REMOVED WITH CATH INTACT. NEW IV
SITE #22 INSERTED TO RFA NEXT TO THE OLD ONE. PATIENT TOLERATED WITH
PROCEDURE.

## 2019-09-04 NOTE — NUR
RECEIVED REPORT FROM ABRAN FALL. PT IS INTUBATED AND SEDATED ON VERSED 0.5
MG/HR. PT IS ALERT AND WAS ABLE TO TRACK DAUGHTER. LEFT PUPIL DILATED, AND
RIGHT PUPIL PINPOINT, REACTION SLUGGISH. PT ON SEIZURE PRECAUTIONS AT THIS
TIME. PT UNABLE TO FOLLOW COMMANDS. PT IS BREATHING E/U ON VENT. SETTINGS
INCLUDE TV: 400, O2: 30%, PEEP: 5, RATE: 12. LUNG SOUNDS CLEAR TO BILATERAL
UPPER LOBES, DIMINISHED TO BILATERAL LOWER LOBES. S1 S2 HEART SOUNDS
AUSCULTATED. PERIPHERAL IV TO RIGHT HAND AND LEFT FA PATENT, DRESSING CDI.
PULSES WEAK X4. SKIN IS WARM AND CONSISTENT WITH ETHNICITY. ABD IS SOFT AND
FLAT WITH ACTIVE BOWEL SOUNDS X4Q. PEG TUBE PATENT AND INTACT. GLUCERNA
INFUSING AT 50ML/HR. DIAZ DRAINING VIA GRAVITY. URINE IS ARIANA WITH FAIR
OUTPUT. HEALED ULCER TO SACRUM WITH OPTIFOAM INTACT. DAUGHTER AT BEDSIDE. ALL
QUESTIONS AND CONCERNS ANSWRED. WILL CONTINUE TO MONITOR.

## 2019-09-04 NOTE — NUR
REASSESSMENT IMPLEMENTED TO THE PATIENT.
GASTRIC RESIDUAL CHECKED 15ML AND REPLACED TO PEG.
THE FAMILY IS AT BEDSIDE VISITING THE PATIENT.

## 2019-09-04 NOTE — NUR
CALLED AND TALKED TO PRASHANT, PHARMACIST; PER PRASHANT, IT IS OK TO MEDICATE THE
PATIENT WITH VANCO IV SCHEDULED THIS MORNING EVEN THOUGH VANCO TROUGH IS 16.1.
WILL MEDICATE THE MED TO THE PATIENT.

## 2019-09-04 NOTE — NUR
DR PETIT SPOKE WITH PATIENT'S DAUGHTER VIA TELEPHONE AND PROVIDED PATIENT
UPDATE. CONFIRMATION OF FAMILY MEETING TOMORROW 9/5/19 AT 1800 DISCUSSED.
FAMILY IN AGREEMENT WITH PLAN.

## 2019-09-04 NOTE — NUR
THE PATIENT WAS GIVEN A BED BATH; GOWN CHANGED. PATIENT WAS RESTING IN BED
CLOSING EYES. VERSED DRIP REMAINS AT 0.5MG/HR.

## 2019-09-04 NOTE — NUR
THE PATIENT OPENS HER EYES SPONTANEOUSLY BUT UNABLE TO TRACK OR FOLLOW ANY
SIMPLE COMMANDS. RIGHT PUPIL WITH BRISK REACTION TO LIGHT, AND LEFT PUPIL WITH
IRREGULAR SHAPE.  ETT 7.5 IS SECURED IN PLACE TO THE FACE AND AT 21CM AT
LIPLINE. ETT TO VENT VIA VCV/AC MODE: FIO2 30%, RATE 12, , AND PEEP 5.
ORAL CARE PROVIDED TO THE PATIENT. TELE # 3 READS AFIB AT THIS TIME. PEG TUBE
IN PLACE TO RIGHT UPPER ABDOMEN AND CONNECTED TO TUBE FEEDING WITH GLUCERNA AT
50ML/HR. AND FREE WATER FLUSH 250ML/Q6HR. DIAZ CATH TO GRAVITY DRAINING DARK
YELLOW URINE OUTPUT. SCDS TO BLE. HEAD OF BED ELEVATED. SIDE RAILS UP X3 AND
PADDED FOR SEIZURE PRECAUTION. BED IS AT LOWEST POSITION.

## 2019-09-04 NOTE — NUR
PT HAD A SMALL BROWN BM. PERIAREA CLEANED AND Z-GAURD PLACED TO SACRAL COCCYX
WITH NEW OPTIFOAM PLACED, CDI. PROVIDED PT WITH TOTAL BED BATH, CHUCKS, GOWN,
LINEN CHANGED. DIAZ CARE PROVIDED.

## 2019-09-04 NOTE — NUR
DR. GIFFORD AT [facility] FOR ASSESSMENT. UPDATES PROVIDED BY NURSING. NO NEW
ORDERS AT THIS TIME.

## 2019-09-05 VITALS — SYSTOLIC BLOOD PRESSURE: 90 MMHG | DIASTOLIC BLOOD PRESSURE: 57 MMHG

## 2019-09-05 VITALS — DIASTOLIC BLOOD PRESSURE: 60 MMHG | SYSTOLIC BLOOD PRESSURE: 103 MMHG

## 2019-09-05 VITALS — SYSTOLIC BLOOD PRESSURE: 89 MMHG | DIASTOLIC BLOOD PRESSURE: 55 MMHG

## 2019-09-05 VITALS — SYSTOLIC BLOOD PRESSURE: 97 MMHG | DIASTOLIC BLOOD PRESSURE: 59 MMHG

## 2019-09-05 VITALS — SYSTOLIC BLOOD PRESSURE: 93 MMHG | DIASTOLIC BLOOD PRESSURE: 57 MMHG

## 2019-09-05 VITALS — DIASTOLIC BLOOD PRESSURE: 57 MMHG | SYSTOLIC BLOOD PRESSURE: 100 MMHG

## 2019-09-05 VITALS — SYSTOLIC BLOOD PRESSURE: 98 MMHG | DIASTOLIC BLOOD PRESSURE: 62 MMHG

## 2019-09-05 VITALS — DIASTOLIC BLOOD PRESSURE: 53 MMHG | SYSTOLIC BLOOD PRESSURE: 96 MMHG

## 2019-09-05 VITALS — DIASTOLIC BLOOD PRESSURE: 62 MMHG | SYSTOLIC BLOOD PRESSURE: 105 MMHG

## 2019-09-05 VITALS — SYSTOLIC BLOOD PRESSURE: 93 MMHG | DIASTOLIC BLOOD PRESSURE: 52 MMHG

## 2019-09-05 VITALS — DIASTOLIC BLOOD PRESSURE: 66 MMHG | SYSTOLIC BLOOD PRESSURE: 100 MMHG

## 2019-09-05 VITALS — DIASTOLIC BLOOD PRESSURE: 58 MMHG | SYSTOLIC BLOOD PRESSURE: 95 MMHG

## 2019-09-05 LAB
BASOPHILS NFR BLD: 0.1 % (ref 0–2)
BUN SERPL-MCNC: 20 MG/DL (ref 7–18)
CALCIUM SERPL-MCNC: 7.4 MG/DL (ref 8.5–10.1)
CHLORIDE SERPL-SCNC: 102 MMOL/L (ref 98–107)
CO2 SERPL-SCNC: 29.4 MMOL/L (ref 21–32)
CREAT SERPL-MCNC: 0.7 MG/DL (ref 0.6–1)
ERYTHROCYTE [DISTWIDTH] IN BLOOD BY AUTOMATED COUNT: 18.5 % (ref 11.5–14.5)
GFR SERPLBLD BASED ON 1.73 SQ M-ARVRAT: (no result) ML/MIN
GLUCOSE SERPL-MCNC: 121 MG/DL (ref 74–106)
MAGNESIUM SERPL-MCNC: 2.1 MG/DL (ref 1.8–2.4)
PHOSPHATE SERPL-MCNC: 2.6 MG/DL (ref 2.5–4.9)
PLATELET # BLD: 138 X10^3MCL (ref 130–400)
POTASSIUM SERPL-SCNC: 4.1 MMOL/L (ref 3.5–5.1)
SODIUM SERPL-SCNC: 137 MMOL/L (ref 136–145)

## 2019-09-05 NOTE — NUR
Follow-up Nutrition Assessment: IC03/A [facility] FU HR
 
 Dx: UTI
PMHx: Afib, recurrent UTIs, dementia, HTN, DM, pulmonary HTN, aortic stenosis,
CHF, tricuspid insufficiency, anemia
 Labs: (9/5): BG 121H, BUN 20H, HGB 8.2L
 Meds: Colace, D 50%, humulin, Lipitor, zofran
 Diet: Glucerna 1.2 @ 20 ml/hr, goal 50 ml/hr,  ml Q6H
 PO Intake: NPO
 Weights in kg: (9/1) 57, (9/2) 57, (9/3) 59, (9/4) 58.8, (9/5) 58.5
 Skin: ecchymosis BUE, BLE discoloration, healed ulcer on sacral area.
Keny: 11
 I/Os: (9/4) 2354/725 (1629)
 Edema: trace to BUE
 GI: Last BM: 9/5
 
RDN Visit (9/5): Patient was on vent with family member at bedside. TF
Glucerna 1.2 was running @ 50 ml/hr,  ml Q6H. Per RN Cecil, pt is
tolerating TF without any residuals. Patient does not have any Diarrhea at
this time.
 
Estimated Nutritional Needs Based on current body weight 59 kg
 Energy: 1769-0817 vs 1190 kcal/d (30-35 kcal/kg vs [facility] 2003)
 Protein: 71-83 g/d (1.2-1.4 g/kg) - UTI, inflammation
 Fluid: 5626-5852 ml/d (1 ml/kcal) or per doctor
 
Nutrition Diagnosis
1. Increased nutrient needs related to increased metabolic demands as
evidenced by patient on ventilator. (ongoing)
2. Malnutrition related to recurrent hospitalizations in the past as evidenced
by muscle wasting. (ongoing)
 
Intervention
1. Recommend continuing Glucerna 1.2 @ 50 ml/hr, FWF (per MD). This will
provide 1440 kcal and 72g protein. This will meet >75% of estimated calorie
and 100% estimated protein needs of the patient.
 
Monitor/Evaluate
 Goal: Have pt meet at least 75% of estimated needs
 Monitor: TF intake/ tolerance, Labs, GI function
 F/U in 2-3 days as high risk 9/7-8

## 2019-09-05 NOTE — NUR
DR. REYNOLDSED AT BEDSIDE FOR UPDATES. ALL QUESTIONS AND CONCERNS ANSWERED. MADE
 AWARE OF TRENDING DOWN BLOOD PRESSURE.

## 2019-09-05 NOTE — NUR
RECEIVED PT'S REPORT FROM LEAVING NURSE. PT IS AWAKE, ALERT, EYES OPEN
SPOTANEOUS BUT NOT TRACKING, FOLLOWING SIMPLE COMMAND. PT BREATHING ON VENT AC
MODE: PEEP 5, RR 12, , FIO2 30%, MODERATE SECRETION CLEANED VIA SUCTION.
DIAZ IN PLACE, DRAINING VIA GRAVITY, YELLOW COLOR URINE. PT ON AIR MATTRESS,
BLE ELEVATED WITH PILLOWS. IV SITE PATENT, INTACT. VERSED INFUSING AT
0.25MG/HR. WILL CONTINUE TO MONITOR.

## 2019-09-05 NOTE — NUR
PATIENT TO BE TRANSFERRED TO [facility] SICU #9 WITH DR PETIT AS ACCEPTING DOCTOR. [facility] ON WILL CALL. REPORT TO BE PHONED TO
[phone].

## 2019-09-05 NOTE — NUR
PLACED PT BACK ON AC/VC. STARTED SHOWING SIGNS OF FATIGUE, RR 35, VT, 150. RN
MADE AWARE. PT LASTED 2 HRS ON CPAP TRIAL. WILL CONT TO MONITOR

## 2019-09-05 NOTE — NUR
WOUND CARE NURSE AT BEDSIDE, PT HAD BM AT THIS TIME, LOOSE STOOL. SACRAL AREA
ULCER OPEN, 2 X 2 CM. CLEAN AREA, Z-GUARD AND OPTIFORM APPLIED. PHOTO IN
CHART.

## 2019-09-05 NOTE — NUR
WOUND CARE EVALUATION NOTE:
REASON FOR EVALUATION: LOW NEVILLE SCALE AND PRESSURE ULCER
SKIN ASSESSMENT DONE WITH PRIMARY RN AND REPORT OF CHANGE OF CONDITION ON
SACRALCOCCYX, PT. WAS PREVIOUS ADMITTED ON AUGUST WITH PRESSURE ULCER TO
SACRALCOCCYX AREA.BLE /FEET DARKER PIGMENTATION AS HER USUAL CONDITION,
POC DISCUSSED WITH PRIMARY RN, PHOTO TAKEN TO [facility] BY PRIMARY RN.
 
COMORBIDITIES RELATED TO DELAY HEALING AND FURTHER SKIN BREAKS:
INFECTION, RESPIRATORY DISTRESS, LOW ALBUMIN LEVEL AND HOB ELEVATED THE MAJORY
OF TIMES DUE TO MEDICAL REASONS.
 
INTEGUMENTARY:
-LIPS ARE CLEAN AND MOIST
-GT CINDY-STOMA SKIN DRY AND INTACT
-MOIST ASSOCIATED DERMATITIS TO GROINS AREA, REDNESS WITH SKIN INTACT
-SACRALCOCCYX PRESSURE ULCER STAGE 2, WITH 2X2CM SUPERFICIAL DEPTH, WOUND BED
 % GRANULATING MOIST, NO ODOR, WOUND EDGE FLAT AND CINDY-WOUND SKIN
INTACT, SURROUNDING REDNESS/ PURPLE AREA INDICATED FURTHER DAMAGE
-BILATERAL HEELS BLANCHABLE REDNESS RESOLVED
 
RECOMMENDATIONS:
-CLEANSE WITH SOAP AND WATER, PAT DRY, APPLY HYDRAGUARD TO PERINEUM,
GROINS AREAS BID AND OPEN TO AIR
-CLEANSE SACRALCOCCYX PRESSURE ULCER WITH NS, PAT DRY, APPLY Z GUARD AND
COVER WITH OPTIFOAM QD AND PRN IF SOILING.
-OFFLOAD BILATERAL HEELS BY PLACING PILLOWS UNDER CALVES UNLESS OTHERWISE
CONTRAINDICATED
-PRESSURE REDISTRIBUTION SURFACE THERAPY
-TURN AND REPOSITION Q2H, OFFLOAD HEELS AND SACRALCOCCYX
-KEEP SKIN DRY AND CLEAN AT ALL TIMES
-CONTINUE TO FOLLOW RD RECOMMENDATIONS
 
PLEASE CONTACT WOUND CARE NURSE FOR ANY QUESTION AND CHANGE OF WOUND
CONDITION.

## 2019-09-05 NOTE — NUR
[facility] ON UNIT TO TRANSFER PATIENT TO [facility]. PATIENT'S DAUGHTERS
AT BEDSIDE AND ARE AWARE OF TRANSFER. REPORT CALLED TO [place] BY PRIMARY
RN LING WITH ALL QUESTIONS AND CONCERNS ADDRESSED. REPORT GIVEN TO [facility] RN BY
MELISA OSULLIVAN. ALL QUESTIONS AND CONCERNS ADDRESSED.

## 2019-09-05 NOTE — NUR
STILL NO RESPONSE FROM DR. SKINNER. WILL PAGE ON PHONE AGAIN ABOUT TRENDING
DOWN BLOOD PRESSURE < 65 MAP.

## 2019-10-15 NOTE — NUR
DR. GIFFORD AT BEDSIDE SPEAKING WITH DAUGHTER ABOUT PATIENTS UPDATES AND PLAN
FOR PATIENT. Refer to MD. I do not see any stool test results.  Refer to MD regarding lab results.

## 2022-05-16 NOTE — NUR
UPON ASSESSMENT, PATIENT HAD A TEMPERATURE .0 ON TWO CONSECUTIVE CHECKS.
COOLING MEASURES APPLIED AT THIS TIME. WILL REASSESS. No

## 2022-09-12 NOTE — NUR
CHECK PT'S TF RESIDUAL WITH ZERO, PER ORDER INCREASE 10 ML/HR, TF IS @ 45
ML/HR, HOB AND ASP PRECAUTION IN PLACE. unknown

## 2023-03-06 NOTE — NUR
[facility] [place] INPATIENT ENCOUNTER  PHYSICAL MEDICINE AND REHABILITATION  DAILY PROGRESS NOTE           DATE:  3/6/2023    Patient Name:  Nanette Miramontes  YOB: 1949  MRN:  [id number]     SUBJECTIVE     Working with PT. She says that she wants to go home. No c/o SOB. Has incisional pain. Not on O2.      OBJECTIVE     REVIEW OF SYSTEMS:  Review of Systems   Constitutional: Negative for fever.   Respiratory: Negative for shortness of breath.    Cardiovascular: Negative for chest pain.   Gastrointestinal: Negative for vomiting.       VITAL SIGNS:     Vital Last Value 24 Hour Range   Temperature 98.3 °F (36.8 °C) (03/06/23 0933) Temp  Min: 97.9 °F (36.6 °C)  Max: 98.6 °F (37 °C)   Pulse 68 (03/06/23 0933) Pulse  Min: 55  Max: 68   Respiratory 16 (03/06/23 0933) Resp  Min: 16  Max: 16   Non-Invasive  Blood Pressure 131/75 (03/06/23 0933) BP  Min: 107/73  Max: 139/80   Pulse Oximetry 97 % (03/06/23 0933) SpO2  Min: 96 %  Max: 100 %     Vital Today Admitted   Weight 113 kg (249 lb 1.9 oz) (03/05/23 1733) Weight: 113 kg (249 lb 1.9 oz) (03/05/23 1733)   `  PVR:      PHYSICAL EXAMINATION:  Physical Exam  Vitals reviewed.   HENT:      Head: Normocephalic.   Cardiovascular:      Rate and Rhythm: Normal rate.   Pulmonary:      Comments: Decreased BS L base  Abdominal:      Palpations: Abdomen is soft.   Musculoskeletal:         General: No swelling.   Neurological:      Mental Status: She is alert.         LABORATORY DATA:    Recent Labs   Lab 03/06/23  0617 03/05/23  0818 03/03/23  0513 03/02/23  0451 03/01/23  0358 02/28/23  0400 02/27/23  1313   WBC 18.8* 13.9* 18.0*   < > 17.1* 10.8 15.0*   RBC 3.87* 3.97* 3.47*   < > 3.15* 3.27* 3.59*   HGB 10.3* 10.4* 9.1*   < > 8.3* 8.8* 9.6*   HCT 32.8* 33.9* 28.4*   < > 25.7* 27.1* 30.4*   MCV 84.8 85.4 81.8   < > 81.6 82.9 84.7   MCH 26.6 26.2 26.2   < > 26.3 26.9 26.7   MCHC 31.4* 30.7* 32.0   < > 32.3 32.5 31.6*   RDWCV 16.2* 16.0* 15.8*   < > 15.9* 15.4* 15.4*   PLT  PT PLACED BACK ON CPAP BY MAXX CAO. WILL CONTINUE TO MONITOR PT 322 249 203   < > 120* 117* 152   TLYMPH  --   --   --   --  15 13 11    < > = values in this interval not displayed.       Recent Labs   Lab 03/06/23  0617 03/05/23  0818 03/03/23  0513   SODIUM 138 141 137   CHLORIDE 100 99 98   BUN 26* 29* 21*   BCRAT 28* 34* 25   POTASSIUM 4.7 3.7 3.9   GLUCOSE 85 91 127*   CREATININE 0.93 0.85 0.84   CALCIUM 9.9 10.0 9.4       Recent Labs   Lab 03/06/23  0523 03/05/23 2018 03/05/23  1636 03/05/23  1116 03/05/23  0708 03/04/23 2025 03/04/23  1629 03/04/23  1118   GLUB 117* 130* 115* 145* 95 175* 153* 125*       Recent Labs   Lab 02/28/23  0400 02/27/23  1313   INR 1.1 1.2       IMAGING STUDIES:   No results found.      Current Functional Status     Txs: Min A     ASSESSMENT/PLAN     Pt is a 73 y.o. woman s/p CABG with debility.     1. Rehab. PT, OT, rehabilitation nursing, and daily physiatry management. Eager to go home.      2. Coronary artery disease status post four-vessel CABG  - Dr Neves on c/s  - sternotomy precautions     3. Hypertension/hypertensive heart disease/mitral insufficiency/dyslipidemia  - on aspirin, statin, metoprolol, furosemide  - Currently home Imdur and losartan/HCTZ on hold  - On IV Lasix to transition to 40mg BID on Saturday  - incentive spirometry  - on midodrine 15 mg tid per cv surgery     4. Anemia of acute blood loss  - Hemoglobin stable at 10.4  - Status post 1 platelet and PRBC transfusion SICU     5. Postop leukocytosis  - continue to monitor, afebrile  - may be d/t steroids   - WBC 18K on steroid taper, CTM      6. R knee gout/pseudogout  - on home allopurinol  - improved sxs with steroid dose maria alejandra     7. DM  - no diabetes meds per med rec  - on Lantus 15 units, decrease to 10 units (3/6), CTM  - a1C  6.1 with diet control  - monitor on medrol dose pack      8. DVT prophy  - Lovenox      9. GI  - PPI  - bowel program     10. Pain  - gabapentin, prn acetaminophen and tramadol      Electronically Signed By     Susan Peacock MD

## 2024-03-21 NOTE — NUR
PT HAD TEMP .4. GIVEN TYLENOL 650MG 2 TABS VIA PEG TUBE. COOLING
MEASURES IN PLACE. WILL REASSESS. READY FOR XRAY AND VQ     Geni Croea, BENSON  03/21/24 8632

## 2024-04-10 NOTE — NUR
DR. CONWAY IS AT BEDSIDE EXPLAINING THE THORACENTESIS PROCEDURE; RISK AND
BENEFITS TO THE DAUGHTER, CONG. THEN DOCTOR WILL START THE PROCEDURE
AFTER TALKING TO THE DAUGHTER. Detail Level: Zone Photo Preface (Leave Blank If You Do Not Want): Photographs were obtained today